# Patient Record
Sex: FEMALE | Race: WHITE | URBAN - METROPOLITAN AREA
[De-identification: names, ages, dates, MRNs, and addresses within clinical notes are randomized per-mention and may not be internally consistent; named-entity substitution may affect disease eponyms.]

---

## 2017-01-01 ENCOUNTER — HOSPITAL ENCOUNTER (OUTPATIENT)
Facility: CLINIC | Age: 42
Setting detail: OBSERVATION
Discharge: HOME | End: 2017-01-04
Attending: EMERGENCY MEDICINE | Admitting: INTERNAL MEDICINE
Payer: COMMERCIAL

## 2017-01-01 DIAGNOSIS — K80.20 CALCULUS OF GALLBLADDER WITHOUT CHOLECYSTITIS WITHOUT OBSTRUCTION: ICD-10-CM

## 2017-01-01 DIAGNOSIS — K81.9 CHOLECYSTITIS: Primary | ICD-10-CM

## 2017-01-01 LAB
ALBUMIN UR-MCNC: NEGATIVE MG/DL
APPEARANCE UR: CLEAR
BILIRUB UR QL STRIP: NEGATIVE
COLOR UR AUTO: YELLOW
GLUCOSE UR STRIP-MCNC: NEGATIVE MG/DL
HCG UR QL: NEGATIVE
HGB UR QL STRIP: NEGATIVE
KETONES UR STRIP-MCNC: NEGATIVE MG/DL
LEUKOCYTE ESTERASE UR QL STRIP: NEGATIVE
NITRATE UR QL: NEGATIVE
PH UR STRIP: 5.5 PH (ref 5–7)
RBC #/AREA URNS AUTO: 1 /HPF (ref 0–2)
SP GR UR STRIP: 1.02 (ref 1–1.03)
SQUAMOUS #/AREA URNS AUTO: 3 /HPF (ref 0–1)
URN SPEC COLLECT METH UR: ABNORMAL
UROBILINOGEN UR STRIP-MCNC: NORMAL MG/DL (ref 0–2)
WBC #/AREA URNS AUTO: 0 /HPF (ref 0–2)

## 2017-01-01 PROCEDURE — 96376 TX/PRO/DX INJ SAME DRUG ADON: CPT

## 2017-01-01 PROCEDURE — 81025 URINE PREGNANCY TEST: CPT | Performed by: EMERGENCY MEDICINE

## 2017-01-01 PROCEDURE — 99285 EMERGENCY DEPT VISIT HI MDM: CPT | Mod: 25

## 2017-01-01 PROCEDURE — 81001 URINALYSIS AUTO W/SCOPE: CPT | Performed by: EMERGENCY MEDICINE

## 2017-01-01 PROCEDURE — 87086 URINE CULTURE/COLONY COUNT: CPT | Performed by: EMERGENCY MEDICINE

## 2017-01-01 PROCEDURE — 96375 TX/PRO/DX INJ NEW DRUG ADDON: CPT | Mod: 59

## 2017-01-01 PROCEDURE — 96374 THER/PROPH/DIAG INJ IV PUSH: CPT | Mod: 59

## 2017-01-01 RX ORDER — ONDANSETRON 2 MG/ML
4 INJECTION INTRAMUSCULAR; INTRAVENOUS EVERY 30 MIN PRN
Status: DISCONTINUED | OUTPATIENT
Start: 2017-01-01 | End: 2017-01-02

## 2017-01-01 RX ORDER — LIDOCAINE 40 MG/G
CREAM TOPICAL
Status: DISCONTINUED | OUTPATIENT
Start: 2017-01-01 | End: 2017-01-02

## 2017-01-01 RX ORDER — SODIUM CHLORIDE 9 MG/ML
1000 INJECTION, SOLUTION INTRAVENOUS CONTINUOUS
Status: DISCONTINUED | OUTPATIENT
Start: 2017-01-01 | End: 2017-01-02

## 2017-01-01 RX ORDER — MORPHINE SULFATE 4 MG/ML
4 INJECTION, SOLUTION INTRAMUSCULAR; INTRAVENOUS
Status: COMPLETED | OUTPATIENT
Start: 2017-01-01 | End: 2017-01-02

## 2017-01-01 NOTE — IP AVS SNAPSHOT
MRN:[id number]                      After Visit Summary   1/1/2017    Arcelia Odom    MRN: [id number]           Thank you!     Thank you for choosing [facility] for your care. Our goal is always to provide you with excellent care. Hearing back from our patients is one way we can continue to improve our services. Please take a few minutes to complete the written survey that you may receive in the mail after you visit. If you would like to speak to someone directly about your visit please contact Patient Relations at [phone]. Thank you!          Patient Information     Date Of Birth          1975        About your hospital stay     You were admitted on:  January 2, 2017 You last received care in the:  [facility]    You were discharged on:  January 4, 2017        Reason for your hospital stay       You were admitted for concerns of a gallbladder attack. You were seen by general surgery who opted to remove your gallbladder. You did well following the procedure so you were allowed to discharge home.                  Who to Call     For medical emergencies, please call 911.  For non-urgent questions about your medical care, please call your primary care provider or clinic, [phone]  For questions related to your surgery, please call your surgery clinic        Attending Provider     Provider    Zenon Ugalde DO Loecken, Scott Peter, MD       Primary Care Provider Office Phone # Fax #    [facility] [phone] [id number] [facility] [address]        After Care Instructions     Activity       Your activity upon discharge: Activity as advised by surgery.            Diet       Follow this diet upon discharge: Diet as advised by surgery.                  Follow-up Appointments     Follow-up and recommended labs and tests        Follow up with surgery as recommended.  Follow up with PCP in 1-2 weeks for  hospital follow up. Recheck LFTs. Avoid alcohol use until recheck with PCP                  Further instructions from your care team       HOME CARE FOLLOWING LAPAROSCOPIC CHOLECYSTECTOMY  MAHOGANY Hernandez, MARIETTA Perez, RAMONITA Gillis, CLAU Clark    INCISIONAL CARE:  Replace the bandage over your incisions until all drainage stops, or if more comfortable to have in place.  If present, leave the steri-strips (white paper tapes) in place till they fall off.  If Dermabond (a type of skin glue) is present, leave in place until it wears/flakes off.     BATHING:  Avoid baths for 1 week after surgery.  Showers are okay.  You may wash your hair at any time.  Gently pat your incisions dry after bathing.    ACTIVITY:  Light Activity -- you may immediately be up and about as tolerated.  Driving -- you may drive when comfortable and off narcotic pain medications.  Light Work -- resume when comfortable off pain medications.  (If you can drive, you probably can work.)  Strenuous Work/Activity -- limit lifting to 20 pounds for 1 week.  Progressively increase with time.  Active Sports (running, biking, etc.) -- cautiously resume after 2 weeks.    DISCOMFORT:  Use pain medications as prescribed by your surgeon.  Take the pain medication with some food, when possible, to minimize side effects.  Intermittent use of ice packs at the incision sites may help during the first 48 hours.  Expect gradual improvement.  You may experience shoulder pain, which is due to the air placed within your abdomen during the procedure.  This is temporary and usually passes within 2 days.    DIET:  Drink plenty of fluids.  While taking pain medications, increase dietary fiber or add a fiber supplementation like Metamucil or Citrucel to help prevent constipation - a possible side effect of pain medications.  It is not uncommon to experience some bowel changes (loose stools or constipation) after surgery.  Your body has to adapt  to you no longer having a gall bladder.  To help minimize this side effect, avoid fatty foods for the first week after surgery.  You may then slowly increase the amount of fatty foods in your diet.      NAUSEA:  If nauseated from the anesthetic/pain meds; rest in bed, get up cautiously with assistance, and drink clear liquids (juice, tea, broth).    RETURN APPOINTMENT:  Schedule a follow-up visit 1-3 weeks post-op.  Office Phone:  [phone]     CONTACT US IF THE FOLLOWING DEVELOPS:   1. A fever that is above 101     2. If there is a large amount of drainage, bleeding, or swelling.   3. Severe pain that is not relieved by your prescription.   4. Drainage that is thick, cloudy, yellow, green or white.   5. Any other questions not answered by  Frequently Asked Questions  sheet.      FREQUENTLY ASKED QUESTIONS:    Q:  How should my incision look?    A:  Normally your incision will appear slightly swollen with light redness directly along the incision itself as it heals.  It may feel like a bump or ridge as the healing/scarring happens, and over time (3-4 months) this bump or ridge feeling should slowly go away.  In general, clear or pink watery drainage can be normal at first as your incision heals, but should decrease over time.    Q:  How do I know if my incision is infected?  A:  Look at your incision for signs of infection, like redness around the incision spreading to surrounding skin, or drainage of cloudy or foul-smelling drainage.  If you feel warm, check your temperature to see if you are running a fever.    **If any of these things occur, please notify the nurse at our office.  We may need you to come into the office for an incision check.      Q:  How do I take care of my incision?  A:  If you have a dressing in place - Starting the day after surgery, replace the dressing 1-2 times a day until there is no further drainage from the incision.  At that time, a dressing is no longer needed.  Try to minimize  tape on the skin if irritation is occurring at the tape sites.  If you have significant irritation from tape on the skin, please call the office to discuss other method of dressing your incision.    Small pieces of tape called  steri-strips  may be present directly overlying your incision; these may be removed 10 days after surgery unless otherwise specified by your surgeon.  If these tapes start to loosen at the ends, you may trim them back until they fall off or are removed.    A:  If you had  Dermabond  tissue glue used as a dressing (this causes your incision to look shiny with a clear covering over it) - This type of dressing wears off with time and does not require more dressings over the top unless it is draining around the glue as it wears off.  Do not apply ointments or lotions over the incisions until the glue has completely worn off.    Q:  There is a piece of tape or a sticky  lead  still on my skin.  Can I remove this?  A:  Sometimes the sticky  leads  used for monitoring during surgery or for evaluation in the emergency department are not all removed while you are in the hospital.  These sometimes have a tab or metal dot on them.  You can easily remove these on your own, like taking off a band-aid.  If there is a gel substance under the  lead , simply wipe/clean it off with a washcloth or paper towel.      Q:  What can I do to minimize constipation (very hard stools, or lack of stools)?  A:  Stay well hydrated.  Increase your dietary fiber intake or take a fiber supplement -with plenty of water.  Walk around frequently.  You may consider an over-the-counter stool-softener.  Your Pharmacist can assist you with choosing one that is stocked at your pharmacy.  Constipation is also one of the most common side effects of pain medication.  If you are using pain medication, be pro-active and try to PREVENT problems with constipation by taking the steps above BEFORE constipation becomes a problem.    Q:  What  do I do if I need more pain medications?  A:  Call the office to receive refills.  Be aware that certain pain meds cannot be called into a pharmacy and actually require a paper prescription.  A change may be made in your pain med as you progress thru your recovery period or if you have side effects to certain meds.    --Pain meds are NOT refilled after 5pm on weekdays, and NOT AT ALL on the weekends, so please look ahead to prevent problems.      Q:  Why am I having a hard time sleeping now that I am at home?  A:  Many medications you receive while you are in the hospital can impact your sleep for a number of days after your surgery/hospitalization.  Decreased level of activity and naps during the day may also make sleeping at night difficult.  Try to minimize day-time naps, and get up frequently during the day to walk around your home during your recovery time.  Sleep aides may be of some help, but are not recommended for long-term use.      Q:  I am having some back discomfort.  What should I do?  A:  This may be related to certain positioning that was required for your surgery, extended periods of time in bed, or other changes in your overall activity level.  You may try ice, heat, acetaminophen, or ibuprofen to treat this temporarily.  Note that many pain medications have acetaminophen in them and would state this on the prescription bottle.  Be sure not to exceed the maximum of 4000mg per day of acetaminophen.     **If the pain you are having does not resolve, is severe, or is a flare of back pain you have had on other occasions prior to surgery, please contact your primary physician for further recommendations or for an appointment to be examined at their office.    Q:  Why am I having headaches?  A:  Headaches can be caused by many things:  caffeine withdrawal, use of pain meds, dehydration, high blood pressure, lack of sleep, over-activity/exhaustion, flare-up of usual migraine headaches.  If you feel this  is related to muscle tension (a band-like feeling around the head, or a pressure at the low-back of the head) you may try ice or heat to this area.  You may need to drink more fluids (try electrolyte drink like Gatorade), rest, or take your usual migraine medications.   **If your headaches do not resolve, worsen, are accompanied by other symptoms, or if your blood pressure is high, please call your primary physician for recommendation and/or examination.    Q:  I am unable to urinate.  What do I do?  A:  A small percentage of people can have difficulty urinating initially after surgery.  This includes being able to urinate only a very small amount at a time and feeling discomfort or pressure in the very low abdomen.  This is called  urinary retention , and is actually an urgent situation.  Proceed to your nearest Emergency department for evaluation (not an Urgent Care Center).  Sometimes the bladder does not work correctly after certain medications you receive during surgery, or related to certain procedures.  You may need to have a catheter placed until your bladder recovers.  When planning to go to an Emergency department, it may help to call the ER to let them know you are coming in for this problem after a surgery.  This may help you get in quicker to be evaluated.  **If you have symptoms of a urinary tract infection, please contact your primary physician for the proper evaluation and treatment.          If you have other questions, please call the office Monday thru Friday between 8am and 5pm to discuss with the nurse or physician assistant.  [phone]    There is a surgeon ON CALL on weekday evenings and over the weekend in case of urgent need only, and may be contacted at the same number.    If you are having an emergency, call 911 or proceed to your nearest emergency department.          Pending Results     No orders found from 12/31/2016 to 1/2/2017.            Statement of Approval     Ordered        "   1756  I have reviewed and agree with all the recommendations and orders detailed in this document.   EFFECTIVE NOW     Approved and electronically signed by:  Cecelia Stack PA-C           17 0921  I have reviewed and agree with all the recommendations and orders detailed in this document.   EFFECTIVE NOW     Approved and electronically signed by:  Raquel Spencer PA-C           17 0856  I have reviewed and agree with all the recommendations and orders detailed in this document.   EFFECTIVE NOW     Approved and electronically signed by:  Pearl Dasilva PA-C             Admission Information        Department Dept Phone    2017  Pediatrics [phone]      Your Vitals Were     Blood Pressure Pulse Temperature Respirations    123/81 mmHg 68 97.5  F (36.4  C) (Oral) 16    Height Weight BMI (Body Mass Index) Pulse Oximetry    1.626 m (5' 4\") 69.6 kg (153 lb 7 oz) 26.32 kg/m2 94%    Last Period             2016 (Exact Date)         [employer] Information     [employer] lets you send messages to your doctor, view your test results, renew your prescriptions, schedule appointments and more. To sign up, go to [email or web address] . Click on \"Log in\" on the left side of the screen, which will take you to the Welcome page. Then click on \"Sign up Now\" on the right side of the page.     You will be asked to enter the access code listed below, as well as some personal information. Please follow the directions to create your username and password.     Your access code is: [email or web address]  Expires: 2017  3:52 AM     Your access code will  in 90 days. If you need help or a new code, please call your [facility] clinic or [phone].        Care EveryWhere ID     This is your Care EveryWhere ID. This could be used by other organizations to access your [facility] medical records  [id number]           Review of your medicines      START taking        Dose / Directions    " oxyCODONE 5 MG IR tablet   Commonly known as:  ROXICODONE   Used for:  Cholecystitis   Notes to Patient:  Drink plenty of fluids and take a stool softener to prevent constipation.        Dose:  5-10 mg   Take 1-2 tablets (5-10 mg) by mouth every 3 hours as needed for moderate to severe pain   Quantity:  30 tablet   Refills:  0            Where to get your medicines      Some of these will need a paper prescription and others can be bought over the counter. Ask your nurse if you have questions.     Bring a paper prescription for each of these medications    - oxyCODONE 5 MG IR tablet             Protect others around you: Learn how to safely use, store and throw away your medicines at www.disposemymeds.org.             Medication List: This is a list of all your medications and when to take them. Check marks below indicate your daily home schedule. Keep this list as a reference.      Medications           Morning Afternoon Evening Bedtime As Needed    oxyCODONE 5 MG IR tablet   Commonly known as:  ROXICODONE   Take 1-2 tablets (5-10 mg) by mouth every 3 hours as needed for moderate to severe pain   Notes to Patient:  Drink plenty of fluids and take a stool softener to prevent constipation.

## 2017-01-01 NOTE — IP AVS SNAPSHOT
[facility]    [address]    [facility] [address]    Phone:  [phone]    Fax:  [phone]                                       After Visit Summary   1/1/2017    Arcelia Odom    MRN: [id number]           After Visit Summary Signature Page     I have received my discharge instructions, and my questions have been answered. I have discussed any challenges I see with this plan with the nurse or doctor.    ..........................................................................................................................................  Patient/Patient Representative Signature      ..........................................................................................................................................  Patient Representative Print Name and Relationship to Patient    ..................................................               ................................................  Date                                            Time    ..........................................................................................................................................  Reviewed by Signature/Title    ...................................................              ..............................................  Date                                                            Time

## 2017-01-01 NOTE — LETTER
[facility]  [address], [address]  [place]  [id number] [phone]    2017    RE: Arcelia Odom  :  1975      This is to certify that Arcelia Odom has been under my care for surgery since 2016.      Patient is able to return to work/school without restrictions as of 2016 or earlier if able.        Sincerely,            Raquel Spencer PA-C

## 2017-01-02 ENCOUNTER — APPOINTMENT (OUTPATIENT)
Dept: ULTRASOUND IMAGING | Facility: CLINIC | Age: 42
End: 2017-01-02
Attending: EMERGENCY MEDICINE
Payer: COMMERCIAL

## 2017-01-02 ENCOUNTER — APPOINTMENT (OUTPATIENT)
Dept: GENERAL RADIOLOGY | Facility: CLINIC | Age: 42
End: 2017-01-02
Attending: SURGERY
Payer: COMMERCIAL

## 2017-01-02 ENCOUNTER — ANESTHESIA (OUTPATIENT)
Dept: SURGERY | Facility: CLINIC | Age: 42
End: 2017-01-02
Payer: COMMERCIAL

## 2017-01-02 ENCOUNTER — ANESTHESIA EVENT (OUTPATIENT)
Dept: SURGERY | Facility: CLINIC | Age: 42
End: 2017-01-02
Payer: COMMERCIAL

## 2017-01-02 ENCOUNTER — APPOINTMENT (OUTPATIENT)
Dept: CT IMAGING | Facility: CLINIC | Age: 42
End: 2017-01-02
Attending: EMERGENCY MEDICINE
Payer: COMMERCIAL

## 2017-01-02 PROBLEM — K81.9 CHOLECYSTITIS: Status: ACTIVE | Noted: 2017-01-02

## 2017-01-02 LAB
ALBUMIN SERPL-MCNC: 3.5 G/DL (ref 3.4–5)
ALP SERPL-CCNC: 127 U/L (ref 40–150)
ALT SERPL W P-5'-P-CCNC: 216 U/L (ref 0–50)
ANION GAP SERPL CALCULATED.3IONS-SCNC: 8 MMOL/L (ref 3–14)
AST SERPL W P-5'-P-CCNC: 446 U/L (ref 0–45)
BASOPHILS # BLD AUTO: 0 10E9/L (ref 0–0.2)
BASOPHILS NFR BLD AUTO: 0.4 %
BILIRUB SERPL-MCNC: 0.6 MG/DL (ref 0.2–1.3)
BUN SERPL-MCNC: 11 MG/DL (ref 7–30)
CALCIUM SERPL-MCNC: 9.8 MG/DL (ref 8.5–10.1)
CHLORIDE SERPL-SCNC: 102 MMOL/L (ref 94–109)
CO2 SERPL-SCNC: 27 MMOL/L (ref 20–32)
CREAT SERPL-MCNC: 0.73 MG/DL (ref 0.52–1.04)
DIFFERENTIAL METHOD BLD: NORMAL
EOSINOPHIL # BLD AUTO: 0.2 10E9/L (ref 0–0.7)
EOSINOPHIL NFR BLD AUTO: 2 %
ERYTHROCYTE [DISTWIDTH] IN BLOOD BY AUTOMATED COUNT: 12.2 % (ref 10–15)
GFR SERPL CREATININE-BSD FRML MDRD: 87 ML/MIN/1.7M2
GLUCOSE SERPL-MCNC: 105 MG/DL (ref 70–99)
HCT VFR BLD AUTO: 40.9 % (ref 35–47)
HGB BLD-MCNC: 13.9 G/DL (ref 11.7–15.7)
IMM GRANULOCYTES # BLD: 0 10E9/L (ref 0–0.4)
IMM GRANULOCYTES NFR BLD: 0.2 %
LIPASE SERPL-CCNC: 117 U/L (ref 73–393)
LYMPHOCYTES # BLD AUTO: 1.7 10E9/L (ref 0.8–5.3)
LYMPHOCYTES NFR BLD AUTO: 18.3 %
MCH RBC QN AUTO: 28.4 PG (ref 26.5–33)
MCHC RBC AUTO-ENTMCNC: 34 G/DL (ref 31.5–36.5)
MCV RBC AUTO: 84 FL (ref 78–100)
MONOCYTES # BLD AUTO: 0.8 10E9/L (ref 0–1.3)
MONOCYTES NFR BLD AUTO: 8.5 %
NEUTROPHILS # BLD AUTO: 6.7 10E9/L (ref 1.6–8.3)
NEUTROPHILS NFR BLD AUTO: 70.6 %
NRBC # BLD AUTO: 0 10*3/UL
NRBC BLD AUTO-RTO: 0 /100
PLATELET # BLD AUTO: 290 10E9/L (ref 150–450)
POTASSIUM SERPL-SCNC: 3.4 MMOL/L (ref 3.4–5.3)
PROT SERPL-MCNC: 7.7 G/DL (ref 6.8–8.8)
RBC # BLD AUTO: 4.89 10E12/L (ref 3.8–5.2)
SODIUM SERPL-SCNC: 137 MMOL/L (ref 133–144)
WBC # BLD AUTO: 9.5 10E9/L (ref 4–11)

## 2017-01-02 PROCEDURE — 25000125 ZZHC RX 250: Performed by: INTERNAL MEDICINE

## 2017-01-02 PROCEDURE — 47563 LAPARO CHOLECYSTECTOMY/GRAPH: CPT | Performed by: SURGERY

## 2017-01-02 PROCEDURE — 96365 THER/PROPH/DIAG IV INF INIT: CPT

## 2017-01-02 PROCEDURE — 99220 ZZC INITIAL OBSERVATION CARE,LEVL III: CPT | Performed by: INTERNAL MEDICINE

## 2017-01-02 PROCEDURE — 83690 ASSAY OF LIPASE: CPT | Performed by: EMERGENCY MEDICINE

## 2017-01-02 PROCEDURE — 25000125 ZZHC RX 250: Performed by: SURGERY

## 2017-01-02 PROCEDURE — 37000008 ZZH ANESTHESIA TECHNICAL FEE, 1ST 30 MIN: Performed by: SURGERY

## 2017-01-02 PROCEDURE — 96366 THER/PROPH/DIAG IV INF ADDON: CPT | Mod: 59

## 2017-01-02 PROCEDURE — 25000125 ZZHC RX 250: Performed by: EMERGENCY MEDICINE

## 2017-01-02 PROCEDURE — 25800025 ZZH RX 258: Performed by: ANESTHESIOLOGY

## 2017-01-02 PROCEDURE — 37000009 ZZH ANESTHESIA TECHNICAL FEE, EACH ADDTL 15 MIN: Performed by: SURGERY

## 2017-01-02 PROCEDURE — 85025 COMPLETE CBC W/AUTO DIFF WBC: CPT | Performed by: EMERGENCY MEDICINE

## 2017-01-02 PROCEDURE — 88304 TISSUE EXAM BY PATHOLOGIST: CPT | Performed by: SURGERY

## 2017-01-02 PROCEDURE — 76705 ECHO EXAM OF ABDOMEN: CPT

## 2017-01-02 PROCEDURE — 25000125 ZZHC RX 250: Performed by: ANESTHESIOLOGY

## 2017-01-02 PROCEDURE — 25000132 ZZH RX MED GY IP 250 OP 250 PS 637: Performed by: INTERNAL MEDICINE

## 2017-01-02 PROCEDURE — 25500064 ZZH RX 255 OP 636: Performed by: SURGERY

## 2017-01-02 PROCEDURE — 25500064 ZZH RX 255 OP 636: Performed by: EMERGENCY MEDICINE

## 2017-01-02 PROCEDURE — 25000566 ZZH SEVOFLURANE, EA 15 MIN: Performed by: SURGERY

## 2017-01-02 PROCEDURE — 36000058 ZZH SURGERY LEVEL 3 EA 15 ADDTL MIN: Performed by: SURGERY

## 2017-01-02 PROCEDURE — G0378 HOSPITAL OBSERVATION PER HR: HCPCS

## 2017-01-02 PROCEDURE — 88304 TISSUE EXAM BY PATHOLOGIST: CPT | Mod: 26 | Performed by: SURGERY

## 2017-01-02 PROCEDURE — 25000128 H RX IP 250 OP 636: Performed by: EMERGENCY MEDICINE

## 2017-01-02 PROCEDURE — 40000306 ZZH STATISTIC PRE PROC ASSESS II: Performed by: SURGERY

## 2017-01-02 PROCEDURE — 96375 TX/PRO/DX INJ NEW DRUG ADDON: CPT | Mod: 59

## 2017-01-02 PROCEDURE — 40000277 XR SURGERY CARM FLUORO LESS THAN 5 MIN W STILLS

## 2017-01-02 PROCEDURE — 25000128 H RX IP 250 OP 636: Performed by: INTERNAL MEDICINE

## 2017-01-02 PROCEDURE — 80053 COMPREHEN METABOLIC PANEL: CPT | Performed by: EMERGENCY MEDICINE

## 2017-01-02 PROCEDURE — 74177 CT ABD & PELVIS W/CONTRAST: CPT

## 2017-01-02 PROCEDURE — 36000060 ZZH SURGERY LEVEL 3 W FLUORO 1ST 30 MIN: Performed by: SURGERY

## 2017-01-02 PROCEDURE — 71000012 ZZH RECOVERY PHASE 1 LEVEL 1 FIRST HR: Performed by: SURGERY

## 2017-01-02 PROCEDURE — 99203 OFFICE O/P NEW LOW 30 MIN: CPT | Mod: 57 | Performed by: SURGERY

## 2017-01-02 PROCEDURE — 27210794 ZZH OR GENERAL SUPPLY STERILE: Performed by: SURGERY

## 2017-01-02 PROCEDURE — 25000125 ZZHC RX 250: Performed by: NURSE ANESTHETIST, CERTIFIED REGISTERED

## 2017-01-02 PROCEDURE — 27210995 ZZH RX 272: Performed by: SURGERY

## 2017-01-02 RX ORDER — BUPIVACAINE HYDROCHLORIDE 5 MG/ML
INJECTION, SOLUTION EPIDURAL; INTRACAUDAL PRN
Status: DISCONTINUED | OUTPATIENT
Start: 2017-01-02 | End: 2017-01-02

## 2017-01-02 RX ORDER — NALOXONE HYDROCHLORIDE 0.4 MG/ML
.1-.4 INJECTION, SOLUTION INTRAMUSCULAR; INTRAVENOUS; SUBCUTANEOUS
Status: DISCONTINUED | OUTPATIENT
Start: 2017-01-02 | End: 2017-01-03

## 2017-01-02 RX ORDER — AMPICILLIN AND SULBACTAM 2; 1 G/1; G/1
3 INJECTION, POWDER, FOR SOLUTION INTRAMUSCULAR; INTRAVENOUS EVERY 6 HOURS
Status: DISCONTINUED | OUTPATIENT
Start: 2017-01-02 | End: 2017-01-03

## 2017-01-02 RX ORDER — NALOXONE HYDROCHLORIDE 0.4 MG/ML
.1-.4 INJECTION, SOLUTION INTRAMUSCULAR; INTRAVENOUS; SUBCUTANEOUS
Status: DISCONTINUED | OUTPATIENT
Start: 2017-01-02 | End: 2017-01-02

## 2017-01-02 RX ORDER — MEPERIDINE HYDROCHLORIDE 25 MG/ML
12.5 INJECTION INTRAMUSCULAR; INTRAVENOUS; SUBCUTANEOUS
Status: DISCONTINUED | OUTPATIENT
Start: 2017-01-02 | End: 2017-01-02

## 2017-01-02 RX ORDER — GLYCOPYRROLATE 0.2 MG/ML
INJECTION, SOLUTION INTRAMUSCULAR; INTRAVENOUS PRN
Status: DISCONTINUED | OUTPATIENT
Start: 2017-01-02 | End: 2017-01-02

## 2017-01-02 RX ORDER — IOPAMIDOL 755 MG/ML
500 INJECTION, SOLUTION INTRAVASCULAR ONCE
Status: COMPLETED | OUTPATIENT
Start: 2017-01-02 | End: 2017-01-02

## 2017-01-02 RX ORDER — ALBUTEROL SULFATE 0.83 MG/ML
2.5 SOLUTION RESPIRATORY (INHALATION) EVERY 4 HOURS PRN
Status: DISCONTINUED | OUTPATIENT
Start: 2017-01-02 | End: 2017-01-02 | Stop reason: HOSPADM

## 2017-01-02 RX ORDER — PROMETHAZINE HYDROCHLORIDE 25 MG/ML
6.25 INJECTION, SOLUTION INTRAMUSCULAR; INTRAVENOUS
Status: DISCONTINUED | OUTPATIENT
Start: 2017-01-02 | End: 2017-01-02

## 2017-01-02 RX ORDER — ACETAMINOPHEN 325 MG/1
650 TABLET ORAL EVERY 4 HOURS PRN
Status: DISCONTINUED | OUTPATIENT
Start: 2017-01-02 | End: 2017-01-03

## 2017-01-02 RX ORDER — SODIUM CHLORIDE 9 MG/ML
INJECTION, SOLUTION INTRAVENOUS CONTINUOUS
Status: DISCONTINUED | OUTPATIENT
Start: 2017-01-02 | End: 2017-01-03

## 2017-01-02 RX ORDER — FENTANYL CITRATE 50 UG/ML
INJECTION, SOLUTION INTRAMUSCULAR; INTRAVENOUS PRN
Status: DISCONTINUED | OUTPATIENT
Start: 2017-01-02 | End: 2017-01-02

## 2017-01-02 RX ORDER — AMOXICILLIN 250 MG
1-2 CAPSULE ORAL 2 TIMES DAILY
Status: DISCONTINUED | OUTPATIENT
Start: 2017-01-02 | End: 2017-01-03

## 2017-01-02 RX ORDER — PROPOFOL 10 MG/ML
INJECTION, EMULSION INTRAVENOUS PRN
Status: DISCONTINUED | OUTPATIENT
Start: 2017-01-02 | End: 2017-01-02

## 2017-01-02 RX ORDER — ONDANSETRON 4 MG/1
4 TABLET, ORALLY DISINTEGRATING ORAL EVERY 30 MIN PRN
Status: DISCONTINUED | OUTPATIENT
Start: 2017-01-02 | End: 2017-01-02

## 2017-01-02 RX ORDER — FENTANYL CITRATE 50 UG/ML
25-50 INJECTION, SOLUTION INTRAMUSCULAR; INTRAVENOUS
Status: DISCONTINUED | OUTPATIENT
Start: 2017-01-02 | End: 2017-01-02 | Stop reason: HOSPADM

## 2017-01-02 RX ORDER — NEOSTIGMINE METHYLSULFATE 1 MG/ML
VIAL (ML) INJECTION PRN
Status: DISCONTINUED | OUTPATIENT
Start: 2017-01-02 | End: 2017-01-02

## 2017-01-02 RX ORDER — ONDANSETRON 4 MG/1
4 TABLET, ORALLY DISINTEGRATING ORAL EVERY 6 HOURS PRN
Status: DISCONTINUED | OUTPATIENT
Start: 2017-01-02 | End: 2017-01-03

## 2017-01-02 RX ORDER — POLYETHYLENE GLYCOL 3350 17 G/17G
17 POWDER, FOR SOLUTION ORAL DAILY PRN
Status: DISCONTINUED | OUTPATIENT
Start: 2017-01-02 | End: 2017-01-03

## 2017-01-02 RX ORDER — OXYCODONE HYDROCHLORIDE 5 MG/1
5-10 TABLET ORAL
Status: DISCONTINUED | OUTPATIENT
Start: 2017-01-02 | End: 2017-01-03

## 2017-01-02 RX ORDER — SODIUM CHLORIDE, SODIUM LACTATE, POTASSIUM CHLORIDE, CALCIUM CHLORIDE 600; 310; 30; 20 MG/100ML; MG/100ML; MG/100ML; MG/100ML
1000 INJECTION, SOLUTION INTRAVENOUS CONTINUOUS
Status: DISCONTINUED | OUTPATIENT
Start: 2017-01-02 | End: 2017-01-02

## 2017-01-02 RX ORDER — LIDOCAINE 40 MG/G
CREAM TOPICAL
Status: DISCONTINUED | OUTPATIENT
Start: 2017-01-02 | End: 2017-01-02 | Stop reason: HOSPADM

## 2017-01-02 RX ORDER — LIDOCAINE HYDROCHLORIDE 10 MG/ML
INJECTION, SOLUTION INFILTRATION; PERINEURAL PRN
Status: DISCONTINUED | OUTPATIENT
Start: 2017-01-02 | End: 2017-01-02

## 2017-01-02 RX ORDER — ONDANSETRON 2 MG/ML
4 INJECTION INTRAMUSCULAR; INTRAVENOUS EVERY 6 HOURS PRN
Status: DISCONTINUED | OUTPATIENT
Start: 2017-01-02 | End: 2017-01-03

## 2017-01-02 RX ORDER — MORPHINE SULFATE 4 MG/ML
4 INJECTION, SOLUTION INTRAMUSCULAR; INTRAVENOUS ONCE
Status: COMPLETED | OUTPATIENT
Start: 2017-01-02 | End: 2017-01-02

## 2017-01-02 RX ORDER — CEFAZOLIN SODIUM 2 G/100ML
2 INJECTION, SOLUTION INTRAVENOUS
Status: COMPLETED | OUTPATIENT
Start: 2017-01-02 | End: 2017-01-02

## 2017-01-02 RX ORDER — SODIUM CHLORIDE, SODIUM LACTATE, POTASSIUM CHLORIDE, CALCIUM CHLORIDE 600; 310; 30; 20 MG/100ML; MG/100ML; MG/100ML; MG/100ML
INJECTION, SOLUTION INTRAVENOUS CONTINUOUS
Status: DISCONTINUED | OUTPATIENT
Start: 2017-01-02 | End: 2017-01-02 | Stop reason: HOSPADM

## 2017-01-02 RX ORDER — ONDANSETRON 2 MG/ML
INJECTION INTRAMUSCULAR; INTRAVENOUS PRN
Status: DISCONTINUED | OUTPATIENT
Start: 2017-01-02 | End: 2017-01-02

## 2017-01-02 RX ORDER — HYDROMORPHONE HYDROCHLORIDE 1 MG/ML
.3-.5 INJECTION, SOLUTION INTRAMUSCULAR; INTRAVENOUS; SUBCUTANEOUS
Status: DISCONTINUED | OUTPATIENT
Start: 2017-01-02 | End: 2017-01-03

## 2017-01-02 RX ORDER — ONDANSETRON 2 MG/ML
4 INJECTION INTRAMUSCULAR; INTRAVENOUS EVERY 30 MIN PRN
Status: DISCONTINUED | OUTPATIENT
Start: 2017-01-02 | End: 2017-01-02

## 2017-01-02 RX ORDER — HYDROMORPHONE HYDROCHLORIDE 1 MG/ML
.3-.5 INJECTION, SOLUTION INTRAMUSCULAR; INTRAVENOUS; SUBCUTANEOUS EVERY 10 MIN PRN
Status: DISCONTINUED | OUTPATIENT
Start: 2017-01-02 | End: 2017-01-02

## 2017-01-02 RX ADMIN — GLYCOPYRROLATE 0.4 MG: 0.2 INJECTION, SOLUTION INTRAMUSCULAR; INTRAVENOUS at 13:29

## 2017-01-02 RX ADMIN — ONDANSETRON 4 MG: 2 INJECTION INTRAMUSCULAR; INTRAVENOUS at 00:23

## 2017-01-02 RX ADMIN — HYDROMORPHONE HYDROCHLORIDE 0.5 MG: 10 INJECTION, SOLUTION INTRAMUSCULAR; INTRAVENOUS; SUBCUTANEOUS at 15:36

## 2017-01-02 RX ADMIN — Medication 3 MG: at 13:29

## 2017-01-02 RX ADMIN — FENTANYL CITRATE 150 MCG: 50 INJECTION, SOLUTION INTRAMUSCULAR; INTRAVENOUS at 12:44

## 2017-01-02 RX ADMIN — HYDROMORPHONE HYDROCHLORIDE 0.5 MG: 10 INJECTION, SOLUTION INTRAMUSCULAR; INTRAVENOUS; SUBCUTANEOUS at 21:24

## 2017-01-02 RX ADMIN — FENTANYL CITRATE 50 MCG: 50 INJECTION INTRAMUSCULAR; INTRAVENOUS at 14:03

## 2017-01-02 RX ADMIN — AMPICILLIN SODIUM AND SULBACTAM SODIUM 3 G: 2; 1 INJECTION, POWDER, FOR SOLUTION INTRAMUSCULAR; INTRAVENOUS at 10:59

## 2017-01-02 RX ADMIN — SODIUM CHLORIDE: 900 INJECTION INTRAVENOUS at 05:05

## 2017-01-02 RX ADMIN — SENNOSIDES AND DOCUSATE SODIUM 2 TABLET: 8.6; 5 TABLET ORAL at 19:53

## 2017-01-02 RX ADMIN — PROPOFOL 150 MG: 10 INJECTION, EMULSION INTRAVENOUS at 12:44

## 2017-01-02 RX ADMIN — AMPICILLIN SODIUM AND SULBACTAM SODIUM 3 G: 2; 1 INJECTION, POWDER, FOR SOLUTION INTRAMUSCULAR; INTRAVENOUS at 22:30

## 2017-01-02 RX ADMIN — HYDROMORPHONE HYDROCHLORIDE 0.5 MG: 10 INJECTION, SOLUTION INTRAMUSCULAR; INTRAVENOUS; SUBCUTANEOUS at 18:21

## 2017-01-02 RX ADMIN — ROCURONIUM BROMIDE 30 MG: 10 INJECTION INTRAVENOUS at 12:44

## 2017-01-02 RX ADMIN — HYDROMORPHONE HYDROCHLORIDE 1 MG: 1 INJECTION, SOLUTION INTRAMUSCULAR; INTRAVENOUS; SUBCUTANEOUS at 12:57

## 2017-01-02 RX ADMIN — SODIUM CHLORIDE, POTASSIUM CHLORIDE, SODIUM LACTATE AND CALCIUM CHLORIDE: 600; 310; 30; 20 INJECTION, SOLUTION INTRAVENOUS at 12:27

## 2017-01-02 RX ADMIN — ROCURONIUM BROMIDE 20 MG: 10 INJECTION INTRAVENOUS at 12:51

## 2017-01-02 RX ADMIN — SODIUM CHLORIDE 1000 ML: 9 INJECTION, SOLUTION INTRAVENOUS at 00:23

## 2017-01-02 RX ADMIN — LIDOCAINE HYDROCHLORIDE 30 MG: 10 INJECTION, SOLUTION INFILTRATION; PERINEURAL at 12:44

## 2017-01-02 RX ADMIN — SODIUM CHLORIDE: 900 INJECTION INTRAVENOUS at 15:09

## 2017-01-02 RX ADMIN — SODIUM CHLORIDE 69 ML: 9 INJECTION, SOLUTION INTRAVENOUS at 01:07

## 2017-01-02 RX ADMIN — SODIUM CHLORIDE, POTASSIUM CHLORIDE, SODIUM LACTATE AND CALCIUM CHLORIDE: 600; 310; 30; 20 INJECTION, SOLUTION INTRAVENOUS at 13:19

## 2017-01-02 RX ADMIN — MORPHINE SULFATE 4 MG: 4 INJECTION, SOLUTION INTRAMUSCULAR; INTRAVENOUS at 02:11

## 2017-01-02 RX ADMIN — AMPICILLIN SODIUM AND SULBACTAM SODIUM 3 G: 2; 1 INJECTION, POWDER, FOR SOLUTION INTRAMUSCULAR; INTRAVENOUS at 17:01

## 2017-01-02 RX ADMIN — GLYCOPYRROLATE 0.2 MG: 0.2 INJECTION, SOLUTION INTRAMUSCULAR; INTRAVENOUS at 12:44

## 2017-01-02 RX ADMIN — AMPICILLIN SODIUM AND SULBACTAM SODIUM 3 G: 2; 1 INJECTION, POWDER, FOR SOLUTION INTRAMUSCULAR; INTRAVENOUS at 05:05

## 2017-01-02 RX ADMIN — IOPAMIDOL 85 ML: 755 INJECTION, SOLUTION INTRAVENOUS at 01:06

## 2017-01-02 RX ADMIN — MORPHINE SULFATE 4 MG: 4 INJECTION, SOLUTION INTRAMUSCULAR; INTRAVENOUS at 00:23

## 2017-01-02 RX ADMIN — MIDAZOLAM HYDROCHLORIDE 2 MG: 1 INJECTION, SOLUTION INTRAMUSCULAR; INTRAVENOUS at 12:40

## 2017-01-02 RX ADMIN — ONDANSETRON 4 MG: 2 INJECTION INTRAMUSCULAR; INTRAVENOUS at 13:29

## 2017-01-02 RX ADMIN — CEFAZOLIN SODIUM 2 G: 2 INJECTION, SOLUTION INTRAVENOUS at 12:40

## 2017-01-02 RX ADMIN — PHENYLEPHRINE HYDROCHLORIDE 100 MCG: 10 INJECTION, SOLUTION INTRAMUSCULAR; INTRAVENOUS; SUBCUTANEOUS at 13:05

## 2017-01-02 ASSESSMENT — ENCOUNTER SYMPTOMS
DIARRHEA: 0
APPETITE CHANGE: 0
ABDOMINAL PAIN: 1
MYALGIAS: 0
FREQUENCY: 0
FEVER: 0
HEMATURIA: 0
CONSTIPATION: 0
BLOOD IN STOOL: 0
DYSURIA: 0
CHILLS: 0
VOMITING: 1
NAUSEA: 1
DIFFICULTY URINATING: 0

## 2017-01-02 ASSESSMENT — PAIN DESCRIPTION - DESCRIPTORS
DESCRIPTORS: ACHING

## 2017-01-02 ASSESSMENT — ACTIVITIES OF DAILY LIVING (ADL)
DRESS: 0-->INDEPENDENT
COGNITION: 0 - NO COGNITION ISSUES REPORTED
RETIRED_COMMUNICATION: 0-->UNDERSTANDS/COMMUNICATES WITHOUT DIFFICULTY
TOILETING: 0-->INDEPENDENT
FALL_HISTORY_WITHIN_LAST_SIX_MONTHS: NO
SWALLOWING: 0-->SWALLOWS FOODS/LIQUIDS WITHOUT DIFFICULTY
BATHING: 0-->INDEPENDENT
TRANSFERRING: 0-->INDEPENDENT
AMBULATION: 0-->INDEPENDENT
RETIRED_EATING: 0-->INDEPENDENT

## 2017-01-02 ASSESSMENT — LIFESTYLE VARIABLES: TOBACCO_USE: 1

## 2017-01-02 NOTE — OR NURSING
RN on Peds notified patient was unable to void. Will attempt again later and patient transferred upstairs.

## 2017-01-02 NOTE — PHARMACY-ADMISSION MEDICATION HISTORY
Admission medication history interview status for this patient is complete. See EPIC admission navigator for allergy information, prior to admission medications and immunization status.     Medication history interview source(s):Patient  Medication history resources (including written lists, pill bottles, clinic record):None  Primary pharmacy:[facility]    Changes made to PTA medication list:  Added: ---  Deleted: ---  Changed: ---    Actions taken by pharmacist (provider contacted, etc):None     Additional medication history information:None    Medication reconciliation/reorder completed by provider prior to medication history? No      Prior to Admission medications    Not on File

## 2017-01-02 NOTE — CONSULTS
REASON FOR CONSULTATION:  Acute cholecystitis.      HISTORY OF PRESENT ILLNESS:  Ms. Arcelia Odom is a healthy 41-year-old female with no antecedent history of biliary tract disease who came to the ER overnight with abdominal pain.  She states that 2 nights ago she had acute onset of upper abdominal pain which she thought were gas cramps.  This has subsided somewhat, but after having lunch the next day, had recurrence of her symptoms.  She had some mild nausea and loss of appetite but no jaundice, icterus, or fevers.  She did have some loose stools over the last couple of days as well.  Workup in the ER found her to have a mild abnormality in her LFTs, subsequent imaging showed a distended gallbladder with multiple stones, mild wall thickening and minimal duct dilation.  This morning the patient feels somewhat better, though she has no appetite and states that her pain is still persisting, though to a lesser degree.      PAST MEDICAL AND SURGICAL HISTORY:  The patient had no previous surgeries, hospitalizations or chronic medical concerns.      CURRENT MEDICATIONS:  None.      ALLERGIES:  None.      SOCIAL HISTORY:  The patient is .  She is currently here by herself, she works at a [employer] company.  Smokes about 1/4 pack a day.      PHYSICAL EXAMINATION:   VITAL SIGNS:  Ms. Odom is afebrile, temperature maximum was 99.2, pulse 60 with a blood pressure 128/66 Respiratory rate is 14 and 96% saturations on room air.   GENERAL:  She is alert, appropriate, nontoxic.   HEART:  Sounds are regular.   RESPIRATORY:  Breath sounds are without wheezes or crackles.  Sclerae are anicteric, skin is not jaundiced.   ABDOMEN:  Soft without distention or scars.  She has mild epigastric tenderness on palpation and right upper quadrant tenderness with deep inspiration, but no overt Moura sign.      LABORATORY EXAMS:  Notable for ALT and AST of 246 and 446 respectively.  Bilirubin is normal 0.06.  Lipase was also normal.  White  blood cell count is 9.5000.  Hemoglobin is 13.9.      IMAGING:  I reviewed the patient's CT done last evening, this showed gallstones as noted on the subsequent ultrasound which showed a distended gallbladder and mild wall thickening up to 3 mm and mild hepatic duct dilation 7 mm.      IMPRESSION AND RECOMMENDATIONS:  A healthy 41-year-old female without antecedent history of biliary tract disease who presents with intermittent biliary colic and now what seems to be findings more consistent with cholecystitis given the persistence of her symptoms.  I met with the patient this morning, diagramed the biliary tree, discussed the pathophysiology of biliary colic, acute cholecystitis and the like.  I offered cholecystectomy today with cholangiograms given the abnormality of LFTs and duct dilation.  Risks, benefits and alternatives were all reviewed.  The patient is interested in pursuing surgery and will aid her in scheduling today.  We did discuss the potential to discharge this evening, provided she has an uneventful recovery as well.      Thank you very much for this consultation.         FILOMENA VENEGAS MD             D: 2017 07:50   T: 2017 08:06   MT: BLESSING#[id number]      Name:     ESTELLE CHRISTIE   MRN:      [id number]        Account:       [id number]   :      1975           Consult Date:  2017      Document: [id number]       cc: [facility]

## 2017-01-02 NOTE — ANESTHESIA PREPROCEDURE EVALUATION
Anesthesia Evaluation     .        ROS/MED HX    ENT/Pulmonary:     (+)tobacco use, , . .    Neurologic:  - neg neurologic ROS     Cardiovascular:  - neg cardiovascular ROS       METS/Exercise Tolerance:     Hematologic:  - neg hematologic  ROS       Musculoskeletal:  - neg musculoskeletal ROS       GI/Hepatic:  - neg GI/hepatic ROS       Renal/Genitourinary:  - ROS Renal section negative       Endo:  - neg endo ROS       Psychiatric:  - neg psychiatric ROS       Infectious Disease:  - neg infectious disease ROS       Malignancy:         Other: Comment: .Lab Test        01/02/17                       0020          WBC          9.5           HGB          13.9          MCV          84            PLT          290            Lab Test        01/02/17                       0020          NA           137           POTASSIUM    3.4           CHLORIDE     102           CO2          27            BUN          11            CR           0.73          ANIONGAP     8             JEOVANY          9.8           GLC          105*                        Physical Exam  Normal systems: cardiovascular and pulmonary    Airway   Mallampati: II    Dental     Cardiovascular   Rhythm and rate: regular and normal      Pulmonary    breath sounds clear to auscultation                    Anesthesia Plan      History & Physical Review  History and physical reviewed and following examination; no interval change.    ASA Status:  2 .        Plan for General and ETT with Intravenous induction. Maintenance will be Inhalation and Balanced.    PONV prophylaxis:  Ondansetron (or other 5HT-3) and Dexamethasone or Solumedrol       Postoperative Care      Consents  Anesthetic plan, risks, benefits and alternatives discussed with:  Patient or representative..                          .

## 2017-01-02 NOTE — ANESTHESIA CARE TRANSFER NOTE
Patient: Arcelia Odom    LAPAROSCOPIC CHOLECYSTECTOMY (N/A Abdomen)  Additional InformationProcedure(s):  LAPAROSCOPIC CHOLECYSTECTOMY  - Wound Class: II-Clean Contaminated    Diagnosis: cholecystitis  Diagnosis Additional Information: No value filed.    Anesthesia Type:   General, ETT     Note:  Airway :Face Mask  Patient transferred to:PACU  Comments: vss      Vitals: (Last set prior to Anesthesia Care Transfer)              Electronically Signed By: VARUN Rolon CRNA  January 2, 2017  1:46 PM

## 2017-01-02 NOTE — H&P
CHIEF COMPLAINT:  Abdominal pain.      HISTORY OF PRESENT ILLNESS:  Ms. Arcelia Odom is a 41-year-old female with no significant medical history.  She presents to the hospital today for concerns about abdominal pain.  She reports her abdominal pain started two evenings prior to this presentation.  Her pain initially lasted for two hours and then resolved.  Her pain resumed yesterday and has been persistent since that time.  She has also had emesis.  She has had no fevers or chills.  She has had no hematemesis.  She has noticed some stool changes over the past week with some loose stools alternating with constipation symptoms.  She has no history of similar pain in the past.      She is a healthy woman on no chronic medications.      On arrival in the ER this evening, vital signs included a blood pressure of 140/105 with a temperature of 99.2 degrees, heart rate of 80, saturation 97% on room air.  Workup in the ER included basic labs.  Urinalysis unremarkable.  Pregnancy test was negative.  CBC normal.  Lipase normal.  ALT is 269, AST is 446, with normal total bilirubin and alkaline phosphatase.      CT of the abdomen and pelvis was done, showing no acute abnormality.  Cholelithiasis present.      This was followed up with an abdominal ultrasound showing cholelithiasis and gallbladder wall borderline thickened.      I spoke with Dr. Ugalde of the ER who had talked with Dr. Gillis of General Surgery.  Plan is for admission to the hospital, with probable laparoscopic cholecystectomy later on today.      PAST MEDICAL HISTORY:  Nothing significant.      CURRENT MEDICATIONS:  None.      FAMILY HISTORY:  No gallstones in the family.      ALLERGIES:  None.      SOCIAL HISTORY:  The patient is a smoker, but states she is attempting to cut down on this.  She is a social drinker.      REVIEW OF SYSTEMS:  Please see HPI for details.  Comprehensive greater than 10-point review of systems is otherwise negative besides that  detailed above.      PHYSICAL EXAM:   VITAL SIGNS:  Blood pressure is currently 135/70, heart rate 60, afebrile, saturation 96% on room air.   GENERAL:  The patient appears nontoxic and in no acute distress.  Appears awake, alert and oriented x3.  Mood and affect normal and appropriate.   HEENT:  Head is atraumatic.  Sclerae white.  Eyelids normal.  Conjunctivae normal.  Extraocular movements intact.   NECK:  Supple.  No cervical or supraclavicular lymphadenopathy.   HEART:  Regular rate and rhythm.  No significant murmurs.  No lower extremity edema.   LUNGS:  Clear to auscultation bilaterally.  No intercostal retractions.  No conversational dyspnea.   ABDOMEN:  Notable for tenderness to palpation of the right upper quadrant.  Otherwise, nontender, nondistended.  Soft.  No masses.  No organomegaly.   EXTREMITIES:  No edema.   SKIN:  No rash, no jaundice.  Skin is dry to touch.   NEUROLOGIC:  Cranial nerves II through XII are intact.  Moves all extremities appropriately.  Sensation intact to light touch in the upper and lower extremities bilaterally.   PSYCHIATRIC:  The patient is awake, alert and oriented x3.  Mood and affect are normal and appropriate.      LABORATORY AND IMAGING DATA:  Reviewed above in HPI.      IMPRESSION:  Ms. Odom is a 41-year-old female, previously healthy, who presents to the hospital today for abdominal pain.  Workup consistent with cholecystitis.      DIAGNOSIS:  Acute cholecystitis.      PLAN:   1.  Admit to observation.   2.  General Surgery consultation.   3.  Unasyn IV.   4.  Pain control including Dilaudid IV p.r.n.   5.  Keep n.p.o.   6.  Anticipate laparoscopic cholecystectomy later on today.         KIMBERLY LOPEZ MD             D: 2017 04:37   T: 2017 06:19   MT: [id number]      Name:     ESTELLE ODOM   MRN:      [id number]        Account:      [id number]   :      1975           Admitted:     [id number]      Document: [id number]       cc: Lazaro Gillis MD        [facility]

## 2017-01-02 NOTE — PROGRESS NOTES
Patient was seen this AM prior to surgery and was doing better. The hope was she would discharge from PACU but unfortunately there is most likely a stone in her biliary tract so she will have to stay for GI consult and possible ERCP.

## 2017-01-02 NOTE — ED PROVIDER NOTES
"  History     Chief Complaint:  Abdominal pain     The history is provided by the patient and the spouse.      Arcelia Odom is a 41 year old female who presents with her  for evaluation of generalized, \"sharp, shooting\" abdominal pain that radiates to the low back that started last night around 12:30 am and lasted two hours. It then came on again acutely today at 3:30 PM and has been constant since onset. She also had an episode of nausea and vomiting at that time. She has been eating and drinking normally and regularly. She has been producing normal, non-bloody stool and urine regularly. No ill contacts and no fevers, chills, or myalgias. No previous abdominal surgeries. No ingestion of new foods.     Allergies:  No known drug allergies     Medications:    None    Past Medical History:    History reviewed. No pertinent past medical history.    Past Surgical History:    History reviewed. No pertinent past surgical history.    Family History:    History reviewed. No pertinent family history.     Social History:  Tobacco Use: Current Everyday smoker   Alcohol Use: Yes   Marital status:    Here in the ED with:      Review of Systems   Constitutional: Negative for fever, chills and appetite change.   Gastrointestinal: Positive for nausea, vomiting and abdominal pain. Negative for diarrhea, constipation and blood in stool.   Genitourinary: Negative for dysuria, urgency, frequency, hematuria, decreased urine volume and difficulty urinating.   Musculoskeletal: Negative for myalgias.   All other systems reviewed and are negative.      Physical Exam   First Vitals:  BP: (!) 141/103 mmHg  Pulse: 78  Temp: 99.2  F (37.3  C)  Resp: 18  SpO2: 97 %      Patient Vitals for the past 24 hrs:   BP Temp Temp src Pulse Heart Rate Resp SpO2 Weight   01/02/17 0438 128/66 mmHg 98  F (36.7  C) Oral - 60 14 - 69.6 kg (153 lb 7 oz)   01/02/17 0424 - - - - - - 96 % -   01/02/17 0415 - - - - - - 95 % -   01/02/17 " 0412 136/69 mmHg - - - - - 96 % -   01/02/17 0330 126/74 mmHg - - - - - 96 % -   01/02/17 0315 - - - - - - 97 % -   01/02/17 0300 122/80 mmHg - - - - - 96 % -   01/02/17 0245 - - - - - - 94 % -   01/02/17 0230 (!) 118/97 mmHg - - - - - - -   01/02/17 0058 - - - - - - 96 % -   01/02/17 0045 153/88 mmHg - - - - - 95 % -   01/02/17 0042 - - - - - - 96 % -   01/02/17 0030 144/89 mmHg - - - - - 96 % -   01/02/17 0026 135/81 mmHg - - - - - 98 % -   01/01/17 2320 (!) 141/103 mmHg 99.2  F (37.3  C) Temporal 78 - 18 97 % -       Physical Exam  Constitutional: Patient appears well-developed and well-nourished. Patient is in mild/moderate distress  HENT:    Head: No external signs of trauma noted.   Eyes: Conjunctivae are normal. Pupils are equal, round, and reactive to light. No scleral icterus  Cardiovascular: Normal rate, regular rhythm and normal heart sounds.  Exam reveals no friction rub.  No murmur heard.  Pulmonary/Chest: Effort normal and breath sounds normal. No respiratory distress. There are no wheezes. There are no rales.   Abdominal: Soft. Bowel sounds normal. There is no distension. There is LLQ, suprapubic, and RLQ tenderness. There is only minimal discomfort on RUQ palpation. The patient does have rebound tenderness in the RLQ and RUQ. There is no guarding.   Musculoskeletal: Normal range of motion. No CVA tenderness. There is some reproducible right lumbar paraspinal pain on percussion.  Neurological: Patient is alert and oriented to person, place, and time.   Skin: Skin is warm and dry. Patient is not diaphoretic. No jaundice.      Emergency Department Course   Imaging:  Radiographic findings were communicated with the patient and Admitting MD who voiced understanding of the findings.  CT Abdomen Pelvis w/ contrast:   1. No acute abnormality. No bowel obstruction or inflammation.  2. Cholelithiasis  Preliminary reading per Radiology. Final report to follow.    US Abdomen Limited (RUQ only):  1.  Cholelithiasis. The gallbladder wall is borderline thickened. No  other evidence of cholecystitis.  2. Mild dilatation of the common bile duct without cause of  obstruction seen. No intrahepatic biliary dilatation.  Preliminary reading per Radiology. Final report to follow.    Laboratory:  CBC: WBC: 9.5 (WNL), HGB: 13.9 (WNL), PLT: 290 (WNL), O/W WNL.    CMP: Glucose: 105 (high), ALT: 216 (high), AST: 446 (high), Creatinine: 0.73 (WNL), O/W WNL.    Lipase: 117 (WNL)    UA: Squamous Epithelial/HPF: 3 (high), O/W NEG.    HCG Qualitative: Negative    Interventions:   0023 Sodium chloride 0.9% 1,000 mL IV  0023 Zofran, 4 mg, IV  0023 Morphine, 4 mg, IV  0211 Morphine, 4 mg, IV    Emergency Department Course:   Nursing notes and vitals reviewed.  I performed an exam of the patient as documented above.    IV access was established.  Blood drawn and urine collected for laboratory testing  The patient underwent the following imaging: US,CT. See results above.   0147 Recheck. I discussed the results of the patient's testing and plan with the patient.  0316 I discussed the case with Dr. Gillis of surgery  0325 Recheck.  0349 I discussed the case with Dr. Jesus of the hospitalist service.  Findings and plan explained to the Patient who consents to admission. Discussed the patient with Dr. Jesus, who will admit the patient to a surg bed for further monitoring, evaluation, and treatment.       Impression & Plan    Medical Decision Making:  Arcelia Odom is a 41 year old female who presents to the ED complaining of diffuse abdominal pain. Her ED work-up demonstrated cholelithiasis. Her pain has improved with medications in the ED. I discussed the case with general surgery, and they state if the patient feels incredibly well and would like to follow-up in the outpatient setting, she could. But since her pain was fairly intense when she came in plus the US findings and blood work findings, it would be reasonable to  consider removing her gallbladder today. I discussed all this with the patient and her , and she would like to proceed with gallbladder removal today. I discussed this with the hospitalist and he will place her in the hospital for further care and coordination of this condition.     Diagnosis:    ICD-10-CM    1. Calculus of gallbladder without cholecystitis without obstruction K80.20        Disposition:  Admitted to the hospital for gallbladder removal    I, Jassi Avalos, am serving as a Scribe on 1/1/2017 at 11:53 PM to personally document the services performed by Zenon Ugalde DO, based upon my observations and the provider's statements to me.    1/1/2017   [facility] EMERGENCY DEPARTMENT        Zenon Ugalde DO  01/02/17 0638

## 2017-01-02 NOTE — PLAN OF CARE
Problem: Goal Outcome Summary  Goal: Goal Outcome Summary  Outcome: No Change  VSS, afebrile. Bowel sounds active. Passing flatus. Voiding without difficulty. IV patent and infusing. Ambulating independently. Denies pain. NPO. Will continue to monitor and provide for needs.

## 2017-01-02 NOTE — DISCHARGE SUMMARY
Patient was seen this AM by surgery who are are opting to perform cholecystectomy. As of now there is nothing that will prevent her from discharging from the PACU following the procedure. I spoke with her and she will have somebody that can stay with her for 24 hours after the procedure. Discharge orders have been placed and activity, diet and followup recs will be done by surgery.

## 2017-01-02 NOTE — ANESTHESIA POSTPROCEDURE EVALUATION
Patient: Arcelia Odom    LAPAROSCOPIC CHOLECYSTECTOMY (N/A Abdomen)  Additional InformationProcedure(s):  LAPAROSCOPIC CHOLECYSTECTOMY  - Wound Class: II-Clean Contaminated    Diagnosis:cholecystitis  Diagnosis Additional Information: Acute cholecystitis with retained common bile duct stone.      Anesthesia Type:  General, ETT    Note:  Anesthesia Post Evaluation    Patient location during evaluation: PACU  Patient participation: Able to fully participate in evaluation  Level of consciousness: awake  Pain management: adequate  Airway patency: patent  Cardiovascular status: acceptable  Respiratory status: acceptable  Hydration status: acceptable  PONV: controlled     Anesthetic complications: None          Last vitals:  Filed Vitals:    01/02/17 1430 01/02/17 1500 01/02/17 1515   BP: 124/71 133/82 132/78   Pulse:      Temp: 97.3  F (36.3  C) 97.7  F (36.5  C)    Resp: 18 16 16   SpO2: 92% 96% 94%       Electronically Signed By: Patrick Tineo DO  January 2, 2017  3:35 PM

## 2017-01-02 NOTE — OP NOTE
DATE OF PROCEDURE:  01/02/2017      PREOPERATIVE DIAGNOSIS:  Acute cholecystitis, common duct dilation.      POSTOPERATIVE DIAGNOSES:  Acute cholecystitis with retained common bile duct stone.      PROCEDURE:  Laparoscopic cholecystectomy with intraoperative cholangiogram.      ANESTHESIA:  General plus local.      SURGEON:  Keaton Sol MD      ASSISTANT:  Dipika Izquierdo PA-C, necessary for adequate exposure and minimization of operative time.      SPECIMENS:  Gallbladder and contents.      COMPLICATIONS:  None.      INDICATIONS:  Ms. Arcelia Odom is a healthy 41-year-old female who came to the ER overnight with abdominal pain.  She had several bouts of epigastric pain over the weekend without antecedent history of biliary tract disease.  Because of the symptoms, patient sought evaluation in the ER where she was found to have a distended gallbladder with mild wall thickening, mild duct dilation and several stones.  As the patient was having persistent symptoms this morning, I offered and recommended cholecystectomy today.  Risks of surgery including infection, bleeding, harm to adjacent structures, open conversion, retained stone, bile leak and common duct injury were all reviewed.  The patient verbalized understanding of the above and consented to proceed.      FINDINGS:  Gallbladder was mildly distended, there were stones within the lumen as well as some mild edema of the wall.  Intraoperative cholangiogram showed distal retained stone with some filling of contrast in the duodenum.  Critical view of safety was obtained prior to clipping and dividing the cystic duct, the identity was also confirmed by cholangiography is noted.      DESCRIPTION OF PROCEDURE:  With the patient under excellent general anesthesia in supine position, abdomen was prepped and draped in the usual sterile surgical fashion.  Timeout was then performed confirming the patient, procedure to be done as well as drug allergies.  She did  receive a dose of Ancef prior to incision for infection prophylaxis.  We began by making a curvilinear incision at the superior aspect of the umbilical skin and dissected down to the level of the epigastric fascia with Kocher clamps.  The fascia was grasped and elevated and incised sharply under direct vision.  Stay sutures were placed and the peritoneum was punctured bluntly with a Carmalt clamp.  Lissette trocar was introduced through the defect.  Pneumoperitoneum was applied.  The patient was placed in reverse Trendelenburg position and rolled slightly to her left.  Three further 5 mm ports were placed under direct vision in the epigastrium and right upper quadrant.  The gallbladder was visualized, grasped and elevated, the infundibulum was grasped and retracted laterally.  The fat and serosa along the edge of the infundibulum was incised with electrocautery and swept downwards.  This exposed the presumptive cystic duct and cystic artery.  The artery was bluntly surrounded and clipped and divided adjacent to the node of Calot.  This allowed us to open up a window adjacent to the cystic duct further.  We then placed a Aldana clamp across the low portion of the infundibulum and cannulated the duct adjacent to this.  The bile was aspirated back and this was subsequently flushed without leakage.  We performed cholangiogram without issue, there was a distal meniscus sign consistent with a retained stone in the distal common duct.  This was present despite flushing more than 60 mL of saline under pressure.  We then clipped and divided the cystic duct and removed our Aldana clamp and catheter.  The gallbladder was then dissected free from the gallbladder fossa with electrocautery and placed in an Endocatch pouch.  The right upper quadrant was irrigated and suctioned dry and found to be satisfactorily hemostatic.  We placed a #15 round Alonzo drain through our lateral most 5 mm port placing this slit portion up under the  liver to control any potential bile leak due to the retained stone.  The drain was anchored to the skin with a 2-0 nylon.  The remaining upper ports removed as was the Lissette and pneumoperitoneum was released.  The specimen was delivered through the fascial defect in the pouch and passed off for routine pathology.  We closed the fascia with an 0 Vicryl stitch in figure-of-eight fashion, stay sutures tied atop.  30 mL 0.5% Marcaine were distributed in all incisions.  Skin was closed with 4-0 Vicryls in a deep dermal interrupted fashion and skin glue was applied atop of this.  The patient tolerated the procedure well and was extubated and brought to recovery in excellent condition.  All sharps and sponge counts were correct at the conclusion of the case.         FILOMENA VENEGAS MD             D: 2017 14:04   T: 2017 14:52   MT: [id number]      Name:     ESTELLE CHRISTIE   MRN:      [id number]        Account:        [id number]   :      1975           Procedure Date: 2017      Document: [id number]       cc: Clayton Hendricks MD

## 2017-01-02 NOTE — ED NOTES
abd pain, bilat flank pain and uti sx since yesterday with NV.    Pt A&O x 3, CMS x 3, ABCD's adequate in triage

## 2017-01-03 ENCOUNTER — ANESTHESIA EVENT (OUTPATIENT)
Dept: SURGERY | Facility: CLINIC | Age: 42
End: 2017-01-03
Payer: COMMERCIAL

## 2017-01-03 ENCOUNTER — ANESTHESIA (OUTPATIENT)
Dept: SURGERY | Facility: CLINIC | Age: 42
End: 2017-01-03
Payer: COMMERCIAL

## 2017-01-03 ENCOUNTER — APPOINTMENT (OUTPATIENT)
Dept: INTERVENTIONAL RADIOLOGY/VASCULAR | Facility: CLINIC | Age: 42
End: 2017-01-03
Attending: INTERNAL MEDICINE
Payer: COMMERCIAL

## 2017-01-03 LAB
ALBUMIN SERPL-MCNC: 3 G/DL (ref 3.4–5)
ALP SERPL-CCNC: 105 U/L (ref 40–150)
ALT SERPL W P-5'-P-CCNC: 163 U/L (ref 0–50)
AMYLASE SERPL-CCNC: 38 U/L (ref 30–110)
AST SERPL W P-5'-P-CCNC: 98 U/L (ref 0–45)
BACTERIA SPEC CULT: NO GROWTH
BILIRUB DIRECT SERPL-MCNC: <0.1 MG/DL (ref 0–0.2)
BILIRUB SERPL-MCNC: 0.4 MG/DL (ref 0.2–1.3)
COPATH REPORT: NORMAL
ERCP: NORMAL
LIPASE SERPL-CCNC: 179 U/L (ref 73–393)
Lab: NORMAL
MICRO REPORT STATUS: NORMAL
PLATELET # BLD AUTO: 262 10E9/L (ref 150–450)
PROT SERPL-MCNC: 6.7 G/DL (ref 6.8–8.8)
SPECIMEN SOURCE: NORMAL

## 2017-01-03 PROCEDURE — 36415 COLL VENOUS BLD VENIPUNCTURE: CPT | Performed by: SURGERY

## 2017-01-03 PROCEDURE — 36000060 ZZH SURGERY LEVEL 3 W FLUORO 1ST 30 MIN: Performed by: INTERNAL MEDICINE

## 2017-01-03 PROCEDURE — 71000012 ZZH RECOVERY PHASE 1 LEVEL 1 FIRST HR: Performed by: INTERNAL MEDICINE

## 2017-01-03 PROCEDURE — 85049 AUTOMATED PLATELET COUNT: CPT | Performed by: INTERNAL MEDICINE

## 2017-01-03 PROCEDURE — 36415 COLL VENOUS BLD VENIPUNCTURE: CPT | Performed by: PHYSICIAN ASSISTANT

## 2017-01-03 PROCEDURE — 36000058 ZZH SURGERY LEVEL 3 EA 15 ADDTL MIN: Performed by: INTERNAL MEDICINE

## 2017-01-03 PROCEDURE — 25000125 ZZHC RX 250: Performed by: INTERNAL MEDICINE

## 2017-01-03 PROCEDURE — 25000132 ZZH RX MED GY IP 250 OP 250 PS 637: Performed by: INTERNAL MEDICINE

## 2017-01-03 PROCEDURE — 25500064 ZZH RX 255 OP 636: Performed by: INTERNAL MEDICINE

## 2017-01-03 PROCEDURE — 25000125 ZZHC RX 250: Performed by: NURSE ANESTHETIST, CERTIFIED REGISTERED

## 2017-01-03 PROCEDURE — 96375 TX/PRO/DX INJ NEW DRUG ADDON: CPT

## 2017-01-03 PROCEDURE — 40000067 ZZH STATISTIC ERCP (OR PROCEDURE): Performed by: INTERNAL MEDICINE

## 2017-01-03 PROCEDURE — 25000128 H RX IP 250 OP 636: Performed by: HOSPITALIST

## 2017-01-03 PROCEDURE — 80076 HEPATIC FUNCTION PANEL: CPT | Performed by: SURGERY

## 2017-01-03 PROCEDURE — 25800025 ZZH RX 258: Performed by: ANESTHESIOLOGY

## 2017-01-03 PROCEDURE — C1726 CATH, BAL DIL, NON-VASCULAR: HCPCS | Performed by: INTERNAL MEDICINE

## 2017-01-03 PROCEDURE — 27210995 ZZH RX 272: Performed by: INTERNAL MEDICINE

## 2017-01-03 PROCEDURE — 25000128 H RX IP 250 OP 636: Performed by: INTERNAL MEDICINE

## 2017-01-03 PROCEDURE — 37000008 ZZH ANESTHESIA TECHNICAL FEE, 1ST 30 MIN: Performed by: INTERNAL MEDICINE

## 2017-01-03 PROCEDURE — 37000009 ZZH ANESTHESIA TECHNICAL FEE, EACH ADDTL 15 MIN: Performed by: INTERNAL MEDICINE

## 2017-01-03 PROCEDURE — 25000125 ZZHC RX 250: Performed by: PHYSICIAN ASSISTANT

## 2017-01-03 PROCEDURE — 27210794 ZZH OR GENERAL SUPPLY STERILE: Performed by: INTERNAL MEDICINE

## 2017-01-03 PROCEDURE — 83690 ASSAY OF LIPASE: CPT | Performed by: PHYSICIAN ASSISTANT

## 2017-01-03 PROCEDURE — 96366 THER/PROPH/DIAG IV INF ADDON: CPT

## 2017-01-03 PROCEDURE — 36415 COLL VENOUS BLD VENIPUNCTURE: CPT | Performed by: INTERNAL MEDICINE

## 2017-01-03 PROCEDURE — 25000566 ZZH SEVOFLURANE, EA 15 MIN: Performed by: INTERNAL MEDICINE

## 2017-01-03 PROCEDURE — 40000306 ZZH STATISTIC PRE PROC ASSESS II: Performed by: INTERNAL MEDICINE

## 2017-01-03 PROCEDURE — G0378 HOSPITAL OBSERVATION PER HR: HCPCS

## 2017-01-03 PROCEDURE — C1769 GUIDE WIRE: HCPCS | Performed by: INTERNAL MEDICINE

## 2017-01-03 PROCEDURE — 82150 ASSAY OF AMYLASE: CPT | Performed by: PHYSICIAN ASSISTANT

## 2017-01-03 RX ORDER — PROCHLORPERAZINE MALEATE 5 MG
5-10 TABLET ORAL EVERY 6 HOURS PRN
Status: DISCONTINUED | OUTPATIENT
Start: 2017-01-03 | End: 2017-01-04 | Stop reason: HOSPADM

## 2017-01-03 RX ORDER — ACETAMINOPHEN 325 MG/1
650 TABLET ORAL EVERY 6 HOURS PRN
Status: DISCONTINUED | OUTPATIENT
Start: 2017-01-03 | End: 2017-01-04 | Stop reason: HOSPADM

## 2017-01-03 RX ORDER — NALOXONE HYDROCHLORIDE 0.4 MG/ML
.1-.4 INJECTION, SOLUTION INTRAMUSCULAR; INTRAVENOUS; SUBCUTANEOUS
Status: DISCONTINUED | OUTPATIENT
Start: 2017-01-03 | End: 2017-01-04 | Stop reason: HOSPADM

## 2017-01-03 RX ORDER — PROMETHAZINE HYDROCHLORIDE 25 MG/ML
6.25 INJECTION, SOLUTION INTRAMUSCULAR; INTRAVENOUS
Status: DISCONTINUED | OUTPATIENT
Start: 2017-01-03 | End: 2017-01-03 | Stop reason: HOSPADM

## 2017-01-03 RX ORDER — ONDANSETRON 4 MG/1
4 TABLET, ORALLY DISINTEGRATING ORAL EVERY 30 MIN PRN
Status: DISCONTINUED | OUTPATIENT
Start: 2017-01-03 | End: 2017-01-03 | Stop reason: HOSPADM

## 2017-01-03 RX ORDER — SODIUM CHLORIDE 9 MG/ML
INJECTION, SOLUTION INTRAVENOUS CONTINUOUS
Status: DISCONTINUED | OUTPATIENT
Start: 2017-01-03 | End: 2017-01-04 | Stop reason: HOSPADM

## 2017-01-03 RX ORDER — DEXAMETHASONE SODIUM PHOSPHATE 4 MG/ML
INJECTION, SOLUTION INTRA-ARTICULAR; INTRALESIONAL; INTRAMUSCULAR; INTRAVENOUS; SOFT TISSUE PRN
Status: DISCONTINUED | OUTPATIENT
Start: 2017-01-03 | End: 2017-01-03

## 2017-01-03 RX ORDER — FENTANYL CITRATE 50 UG/ML
25-50 INJECTION, SOLUTION INTRAMUSCULAR; INTRAVENOUS
Status: DISCONTINUED | OUTPATIENT
Start: 2017-01-03 | End: 2017-01-03 | Stop reason: HOSPADM

## 2017-01-03 RX ORDER — MEPERIDINE HYDROCHLORIDE 25 MG/ML
12.5 INJECTION INTRAMUSCULAR; INTRAVENOUS; SUBCUTANEOUS
Status: DISCONTINUED | OUTPATIENT
Start: 2017-01-03 | End: 2017-01-03 | Stop reason: HOSPADM

## 2017-01-03 RX ORDER — FLUMAZENIL 0.1 MG/ML
0.2 INJECTION, SOLUTION INTRAVENOUS
Status: ACTIVE | OUTPATIENT
Start: 2017-01-03 | End: 2017-01-04

## 2017-01-03 RX ORDER — SODIUM CHLORIDE, SODIUM LACTATE, POTASSIUM CHLORIDE, CALCIUM CHLORIDE 600; 310; 30; 20 MG/100ML; MG/100ML; MG/100ML; MG/100ML
1000 INJECTION, SOLUTION INTRAVENOUS CONTINUOUS
Status: DISCONTINUED | OUTPATIENT
Start: 2017-01-03 | End: 2017-01-03 | Stop reason: HOSPADM

## 2017-01-03 RX ORDER — OXYCODONE HYDROCHLORIDE 5 MG/1
5-10 TABLET ORAL
Qty: 30 TABLET | Refills: 0 | Status: SHIPPED | OUTPATIENT
Start: 2017-01-03

## 2017-01-03 RX ORDER — ALBUTEROL SULFATE 0.83 MG/ML
2.5 SOLUTION RESPIRATORY (INHALATION) EVERY 4 HOURS PRN
Status: DISCONTINUED | OUTPATIENT
Start: 2017-01-03 | End: 2017-01-03 | Stop reason: HOSPADM

## 2017-01-03 RX ORDER — ONDANSETRON 2 MG/ML
4 INJECTION INTRAMUSCULAR; INTRAVENOUS EVERY 30 MIN PRN
Status: DISCONTINUED | OUTPATIENT
Start: 2017-01-03 | End: 2017-01-03 | Stop reason: HOSPADM

## 2017-01-03 RX ORDER — INDOMETHACIN 50 MG/1
100 SUPPOSITORY RECTAL
Status: COMPLETED | OUTPATIENT
Start: 2017-01-03 | End: 2017-01-03

## 2017-01-03 RX ORDER — HYDROMORPHONE HYDROCHLORIDE 1 MG/ML
.3-.5 INJECTION, SOLUTION INTRAMUSCULAR; INTRAVENOUS; SUBCUTANEOUS
Status: DISCONTINUED | OUTPATIENT
Start: 2017-01-03 | End: 2017-01-04 | Stop reason: HOSPADM

## 2017-01-03 RX ORDER — GLYCOPYRROLATE 0.2 MG/ML
INJECTION, SOLUTION INTRAMUSCULAR; INTRAVENOUS PRN
Status: DISCONTINUED | OUTPATIENT
Start: 2017-01-03 | End: 2017-01-03

## 2017-01-03 RX ORDER — PROPOFOL 10 MG/ML
INJECTION, EMULSION INTRAVENOUS PRN
Status: DISCONTINUED | OUTPATIENT
Start: 2017-01-03 | End: 2017-01-03

## 2017-01-03 RX ORDER — PROCHLORPERAZINE 25 MG
25 SUPPOSITORY, RECTAL RECTAL EVERY 12 HOURS PRN
Status: DISCONTINUED | OUTPATIENT
Start: 2017-01-03 | End: 2017-01-04 | Stop reason: HOSPADM

## 2017-01-03 RX ORDER — OXYCODONE HYDROCHLORIDE 5 MG/1
5-10 TABLET ORAL
Status: DISCONTINUED | OUTPATIENT
Start: 2017-01-03 | End: 2017-01-04 | Stop reason: HOSPADM

## 2017-01-03 RX ORDER — SODIUM CHLORIDE, SODIUM LACTATE, POTASSIUM CHLORIDE, CALCIUM CHLORIDE 600; 310; 30; 20 MG/100ML; MG/100ML; MG/100ML; MG/100ML
INJECTION, SOLUTION INTRAVENOUS CONTINUOUS
Status: DISCONTINUED | OUTPATIENT
Start: 2017-01-03 | End: 2017-01-03 | Stop reason: HOSPADM

## 2017-01-03 RX ORDER — HYDROMORPHONE HYDROCHLORIDE 1 MG/ML
.3-.5 INJECTION, SOLUTION INTRAMUSCULAR; INTRAVENOUS; SUBCUTANEOUS EVERY 10 MIN PRN
Status: DISCONTINUED | OUTPATIENT
Start: 2017-01-03 | End: 2017-01-03 | Stop reason: HOSPADM

## 2017-01-03 RX ORDER — LIDOCAINE HYDROCHLORIDE 10 MG/ML
INJECTION, SOLUTION INFILTRATION; PERINEURAL PRN
Status: DISCONTINUED | OUTPATIENT
Start: 2017-01-03 | End: 2017-01-03

## 2017-01-03 RX ORDER — LIDOCAINE 40 MG/G
CREAM TOPICAL
Status: DISCONTINUED | OUTPATIENT
Start: 2017-01-03 | End: 2017-01-03 | Stop reason: HOSPADM

## 2017-01-03 RX ORDER — FENTANYL CITRATE 50 UG/ML
INJECTION, SOLUTION INTRAMUSCULAR; INTRAVENOUS PRN
Status: DISCONTINUED | OUTPATIENT
Start: 2017-01-03 | End: 2017-01-03

## 2017-01-03 RX ORDER — ONDANSETRON 2 MG/ML
4 INJECTION INTRAMUSCULAR; INTRAVENOUS EVERY 6 HOURS PRN
Status: DISCONTINUED | OUTPATIENT
Start: 2017-01-03 | End: 2017-01-04 | Stop reason: HOSPADM

## 2017-01-03 RX ORDER — NALOXONE HYDROCHLORIDE 0.4 MG/ML
.1-.4 INJECTION, SOLUTION INTRAMUSCULAR; INTRAVENOUS; SUBCUTANEOUS
Status: DISCONTINUED | OUTPATIENT
Start: 2017-01-03 | End: 2017-01-03 | Stop reason: HOSPADM

## 2017-01-03 RX ORDER — NEOSTIGMINE METHYLSULFATE 1 MG/ML
VIAL (ML) INJECTION PRN
Status: DISCONTINUED | OUTPATIENT
Start: 2017-01-03 | End: 2017-01-03

## 2017-01-03 RX ADMIN — AMPICILLIN SODIUM AND SULBACTAM SODIUM 3 G: 2; 1 INJECTION, POWDER, FOR SOLUTION INTRAMUSCULAR; INTRAVENOUS at 04:52

## 2017-01-03 RX ADMIN — SODIUM CHLORIDE: 9 INJECTION, SOLUTION INTRAVENOUS at 18:39

## 2017-01-03 RX ADMIN — FENTANYL CITRATE 150 MCG: 50 INJECTION, SOLUTION INTRAMUSCULAR; INTRAVENOUS at 11:34

## 2017-01-03 RX ADMIN — GLYCOPYRROLATE 0.2 MG: 0.2 INJECTION, SOLUTION INTRAMUSCULAR; INTRAVENOUS at 11:34

## 2017-01-03 RX ADMIN — DEXAMETHASONE SODIUM PHOSPHATE 4 MG: 4 INJECTION, SOLUTION INTRAMUSCULAR; INTRAVENOUS at 11:34

## 2017-01-03 RX ADMIN — PROPOFOL 150 MG: 10 INJECTION, EMULSION INTRAVENOUS at 11:34

## 2017-01-03 RX ADMIN — ACETAMINOPHEN 650 MG: 325 TABLET, FILM COATED ORAL at 00:15

## 2017-01-03 RX ADMIN — HYDROMORPHONE HYDROCHLORIDE 0.5 MG: 10 INJECTION, SOLUTION INTRAMUSCULAR; INTRAVENOUS; SUBCUTANEOUS at 07:01

## 2017-01-03 RX ADMIN — HYDROMORPHONE HYDROCHLORIDE 0.5 MG: 10 INJECTION, SOLUTION INTRAMUSCULAR; INTRAVENOUS; SUBCUTANEOUS at 02:29

## 2017-01-03 RX ADMIN — PROCHLORPERAZINE EDISYLATE 10 MG: 5 INJECTION INTRAMUSCULAR; INTRAVENOUS at 19:48

## 2017-01-03 RX ADMIN — Medication 3 MG: at 12:03

## 2017-01-03 RX ADMIN — HYDROMORPHONE HYDROCHLORIDE 0.5 MG: 1 INJECTION, SOLUTION INTRAMUSCULAR; INTRAVENOUS; SUBCUTANEOUS at 17:34

## 2017-01-03 RX ADMIN — ONDANSETRON 4 MG: 2 INJECTION INTRAMUSCULAR; INTRAVENOUS at 17:39

## 2017-01-03 RX ADMIN — GLYCOPYRROLATE 0.4 MG: 0.2 INJECTION, SOLUTION INTRAMUSCULAR; INTRAVENOUS at 12:03

## 2017-01-03 RX ADMIN — MIDAZOLAM HYDROCHLORIDE 2 MG: 1 INJECTION, SOLUTION INTRAMUSCULAR; INTRAVENOUS at 11:29

## 2017-01-03 RX ADMIN — SODIUM CHLORIDE: 900 INJECTION INTRAVENOUS at 02:52

## 2017-01-03 RX ADMIN — ROCURONIUM BROMIDE 30 MG: 10 INJECTION INTRAVENOUS at 11:34

## 2017-01-03 RX ADMIN — HYDROMORPHONE HYDROCHLORIDE 0.5 MG: 10 INJECTION, SOLUTION INTRAMUSCULAR; INTRAVENOUS; SUBCUTANEOUS at 04:52

## 2017-01-03 RX ADMIN — HYDROMORPHONE HYDROCHLORIDE 0.5 MG: 10 INJECTION, SOLUTION INTRAMUSCULAR; INTRAVENOUS; SUBCUTANEOUS at 09:26

## 2017-01-03 RX ADMIN — HYDROMORPHONE HYDROCHLORIDE 0.5 MG: 1 INJECTION, SOLUTION INTRAMUSCULAR; INTRAVENOUS; SUBCUTANEOUS at 14:43

## 2017-01-03 RX ADMIN — SODIUM CHLORIDE, POTASSIUM CHLORIDE, SODIUM LACTATE AND CALCIUM CHLORIDE: 600; 310; 30; 20 INJECTION, SOLUTION INTRAVENOUS at 12:04

## 2017-01-03 RX ADMIN — LIDOCAINE HYDROCHLORIDE 30 MG: 10 INJECTION, SOLUTION INFILTRATION; PERINEURAL at 11:34

## 2017-01-03 RX ADMIN — SODIUM CHLORIDE, POTASSIUM CHLORIDE, SODIUM LACTATE AND CALCIUM CHLORIDE: 600; 310; 30; 20 INJECTION, SOLUTION INTRAVENOUS at 11:29

## 2017-01-03 RX ADMIN — HYDROMORPHONE HYDROCHLORIDE 0.5 MG: 1 INJECTION, SOLUTION INTRAMUSCULAR; INTRAVENOUS; SUBCUTANEOUS at 22:08

## 2017-01-03 ASSESSMENT — LIFESTYLE VARIABLES: TOBACCO_USE: 1

## 2017-01-03 NOTE — PROGRESS NOTES
Surgery-[facility]  POD#1 s/p lap traci with cholangiograms for acute cholecystitis with CBD stone  Feels okay but is sore near drain site and in epigastrium.  Did tolerate clears last PM  No fevers, VSS  35 from NAHED  LFTs drawn/pending  NAD, resting, comfortable, no icterus  Abd soft, some epigastric tenderness, NAHED serosanguinous, rare bowel tones.  GI consulted for CBD stone, timing of ERCP still TBD  NPO until plan established  Drain may be removed after stone cleared and tolerates diet.

## 2017-01-03 NOTE — ANESTHESIA CARE TRANSFER NOTE
Patient: Arcelia Odom    ENDOSCOPIC RETROGRADE CHOLANGIOPANCREATOGRAM (N/A Mouth)  Additional InformationProcedure(s):  ENDOSCOPIC RETROGRADE CHOLANGIOPANCREATOGRAM with Sphinctorotomy with Stone Extraction.  - Wound Class: II-Clean Contaminated    Diagnosis: Cholecystitis   Diagnosis Additional Information: No value filed.    Anesthesia Type:   ETT     Note:    Patient transferred to:PACU  Comments: Pt spont resps oral airway suctioned ETT removed to PACU VSS Report to RN      Vitals: (Last set prior to Anesthesia Care Transfer)              Electronically Signed By: VARUN Herbert CRNA  January 3, 2017  12:13 PM

## 2017-01-03 NOTE — DISCHARGE INSTRUCTIONS
HOME CARE FOLLOWING LAPAROSCOPIC CHOLECYSTECTOMY  MAHOGANY Hernandez, MARIETTA Perez, RAMONITA Gillis, CLAU Clark    INCISIONAL CARE:  Replace the bandage over your incisions until all drainage stops, or if more comfortable to have in place.  If present, leave the steri-strips (white paper tapes) in place till they fall off.  If Dermabond (a type of skin glue) is present, leave in place until it wears/flakes off.     BATHING:  Avoid baths for 1 week after surgery.  Showers are okay.  You may wash your hair at any time.  Gently pat your incisions dry after bathing.    ACTIVITY:  Light Activity -- you may immediately be up and about as tolerated.  Driving -- you may drive when comfortable and off narcotic pain medications.  Light Work -- resume when comfortable off pain medications.  (If you can drive, you probably can work.)  Strenuous Work/Activity -- limit lifting to 20 pounds for 1 week.  Progressively increase with time.  Active Sports (running, biking, etc.) -- cautiously resume after 2 weeks.    DISCOMFORT:  Use pain medications as prescribed by your surgeon.  Take the pain medication with some food, when possible, to minimize side effects.  Intermittent use of ice packs at the incision sites may help during the first 48 hours.  Expect gradual improvement.  You may experience shoulder pain, which is due to the air placed within your abdomen during the procedure.  This is temporary and usually passes within 2 days.    DIET:  Drink plenty of fluids.  While taking pain medications, increase dietary fiber or add a fiber supplementation like Metamucil or Citrucel to help prevent constipation - a possible side effect of pain medications.  It is not uncommon to experience some bowel changes (loose stools or constipation) after surgery.  Your body has to adapt to you no longer having a gall bladder.  To help minimize this side effect, avoid fatty foods for the first week after surgery.  You may  then slowly increase the amount of fatty foods in your diet.      NAUSEA:  If nauseated from the anesthetic/pain meds; rest in bed, get up cautiously with assistance, and drink clear liquids (juice, tea, broth).    RETURN APPOINTMENT:  Schedule a follow-up visit 1-3 weeks post-op.  Office Phone:  [phone]     CONTACT US IF THE FOLLOWING DEVELOPS:   1. A fever that is above 101     2. If there is a large amount of drainage, bleeding, or swelling.   3. Severe pain that is not relieved by your prescription.   4. Drainage that is thick, cloudy, yellow, green or white.   5. Any other questions not answered by  Frequently Asked Questions  sheet.      FREQUENTLY ASKED QUESTIONS:    Q:  How should my incision look?    A:  Normally your incision will appear slightly swollen with light redness directly along the incision itself as it heals.  It may feel like a bump or ridge as the healing/scarring happens, and over time (3-4 months) this bump or ridge feeling should slowly go away.  In general, clear or pink watery drainage can be normal at first as your incision heals, but should decrease over time.    Q:  How do I know if my incision is infected?  A:  Look at your incision for signs of infection, like redness around the incision spreading to surrounding skin, or drainage of cloudy or foul-smelling drainage.  If you feel warm, check your temperature to see if you are running a fever.    **If any of these things occur, please notify the nurse at our office.  We may need you to come into the office for an incision check.      Q:  How do I take care of my incision?  A:  If you have a dressing in place - Starting the day after surgery, replace the dressing 1-2 times a day until there is no further drainage from the incision.  At that time, a dressing is no longer needed.  Try to minimize tape on the skin if irritation is occurring at the tape sites.  If you have significant irritation from tape on the skin, please call the  office to discuss other method of dressing your incision.    Small pieces of tape called  steri-strips  may be present directly overlying your incision; these may be removed 10 days after surgery unless otherwise specified by your surgeon.  If these tapes start to loosen at the ends, you may trim them back until they fall off or are removed.    A:  If you had  Dermabond  tissue glue used as a dressing (this causes your incision to look shiny with a clear covering over it) - This type of dressing wears off with time and does not require more dressings over the top unless it is draining around the glue as it wears off.  Do not apply ointments or lotions over the incisions until the glue has completely worn off.    Q:  There is a piece of tape or a sticky  lead  still on my skin.  Can I remove this?  A:  Sometimes the sticky  leads  used for monitoring during surgery or for evaluation in the emergency department are not all removed while you are in the hospital.  These sometimes have a tab or metal dot on them.  You can easily remove these on your own, like taking off a band-aid.  If there is a gel substance under the  lead , simply wipe/clean it off with a washcloth or paper towel.      Q:  What can I do to minimize constipation (very hard stools, or lack of stools)?  A:  Stay well hydrated.  Increase your dietary fiber intake or take a fiber supplement -with plenty of water.  Walk around frequently.  You may consider an over-the-counter stool-softener.  Your Pharmacist can assist you with choosing one that is stocked at your pharmacy.  Constipation is also one of the most common side effects of pain medication.  If you are using pain medication, be pro-active and try to PREVENT problems with constipation by taking the steps above BEFORE constipation becomes a problem.    Q:  What do I do if I need more pain medications?  A:  Call the office to receive refills.  Be aware that certain pain meds cannot be called into a  pharmacy and actually require a paper prescription.  A change may be made in your pain med as you progress thru your recovery period or if you have side effects to certain meds.    --Pain meds are NOT refilled after 5pm on weekdays, and NOT AT ALL on the weekends, so please look ahead to prevent problems.      Q:  Why am I having a hard time sleeping now that I am at home?  A:  Many medications you receive while you are in the hospital can impact your sleep for a number of days after your surgery/hospitalization.  Decreased level of activity and naps during the day may also make sleeping at night difficult.  Try to minimize day-time naps, and get up frequently during the day to walk around your home during your recovery time.  Sleep aides may be of some help, but are not recommended for long-term use.      Q:  I am having some back discomfort.  What should I do?  A:  This may be related to certain positioning that was required for your surgery, extended periods of time in bed, or other changes in your overall activity level.  You may try ice, heat, acetaminophen, or ibuprofen to treat this temporarily.  Note that many pain medications have acetaminophen in them and would state this on the prescription bottle.  Be sure not to exceed the maximum of 4000mg per day of acetaminophen.     **If the pain you are having does not resolve, is severe, or is a flare of back pain you have had on other occasions prior to surgery, please contact your primary physician for further recommendations or for an appointment to be examined at their office.    Q:  Why am I having headaches?  A:  Headaches can be caused by many things:  caffeine withdrawal, use of pain meds, dehydration, high blood pressure, lack of sleep, over-activity/exhaustion, flare-up of usual migraine headaches.  If you feel this is related to muscle tension (a band-like feeling around the head, or a pressure at the low-back of the head) you may try ice or heat to  this area.  You may need to drink more fluids (try electrolyte drink like Gatorade), rest, or take your usual migraine medications.   **If your headaches do not resolve, worsen, are accompanied by other symptoms, or if your blood pressure is high, please call your primary physician for recommendation and/or examination.    Q:  I am unable to urinate.  What do I do?  A:  A small percentage of people can have difficulty urinating initially after surgery.  This includes being able to urinate only a very small amount at a time and feeling discomfort or pressure in the very low abdomen.  This is called  urinary retention , and is actually an urgent situation.  Proceed to your nearest Emergency department for evaluation (not an Urgent Care Center).  Sometimes the bladder does not work correctly after certain medications you receive during surgery, or related to certain procedures.  You may need to have a catheter placed until your bladder recovers.  When planning to go to an Emergency department, it may help to call the ER to let them know you are coming in for this problem after a surgery.  This may help you get in quicker to be evaluated.  **If you have symptoms of a urinary tract infection, please contact your primary physician for the proper evaluation and treatment.          If you have other questions, please call the office Monday thru Friday between 8am and 5pm to discuss with the nurse or physician assistant.  #[phone]    There is a surgeon ON CALL on weekday evenings and over the weekend in case of urgent need only, and may be contacted at the same number.    If you are having an emergency, call 911 or proceed to your nearest emergency department.

## 2017-01-03 NOTE — ANESTHESIA PREPROCEDURE EVALUATION
Anesthesia Evaluation     .        ROS/MED HX    ENT/Pulmonary:     (+)tobacco use, Current use , . .    Neurologic:  - neg neurologic ROS     Cardiovascular:  - neg cardiovascular ROS       METS/Exercise Tolerance:     Hematologic:  - neg hematologic  ROS       Musculoskeletal:  - neg musculoskeletal ROS       GI/Hepatic:  - neg GI/hepatic ROS       Renal/Genitourinary:  - ROS Renal section negative       Endo:  - neg endo ROS       Psychiatric:  - neg psychiatric ROS       Infectious Disease:  - neg infectious disease ROS       Malignancy:         Other: Comment: .Lab Test        01/02/17                       0020          WBC          9.5           HGB          13.9          MCV          84            PLT          290            Lab Test        01/02/17                       0020          NA           137           POTASSIUM    3.4           CHLORIDE     102           CO2          27            BUN          11            CR           0.73          ANIONGAP     8             JEOVANY          9.8           GLC          105*             - neg other ROS           Physical Exam  Normal systems: cardiovascular and pulmonary    Airway   Mallampati: II    Dental     Cardiovascular   Rhythm and rate: regular and normal      Pulmonary    breath sounds clear to auscultation                    Anesthesia Plan      History & Physical Review  History and physical reviewed and following examination; no interval change.    ASA Status:  2 .        Plan for ETT with Intravenous induction. Maintenance will be Inhalation and Balanced.           Postoperative Care      Consents  Anesthetic plan, risks, benefits and alternatives discussed with:  Patient or representative..                          .

## 2017-01-03 NOTE — PLAN OF CARE
Problem: Goal Outcome Summary  Goal: Goal Outcome Summary  Outcome: No Change  VS stable, alert and orientated, up with SBA, went down for ERCP, returned to floor around 1345pm, very hungry, only allowed a clear liquid diet(pt. Aware of diet order, wanted more to eat)  tolerating, BS not audible, not passing any flatus, having increased pain in abdomen (comes in waves, sharp shooting, needing IV pain medications, NAHED drain to rt. Side, Surgery and GI following

## 2017-01-03 NOTE — PROGRESS NOTES
I had a short discussion with the patient about her procedure and she understands that there is a small risk for perforation,bleeding, and infection. She will be getting the ERCP shortly by Dr. Ceron. Thank you.

## 2017-01-03 NOTE — ANESTHESIA POSTPROCEDURE EVALUATION
Patient: Arcelia Odom    ENDOSCOPIC RETROGRADE CHOLANGIOPANCREATOGRAM (N/A Mouth)  Additional InformationProcedure(s):  ENDOSCOPIC RETROGRADE CHOLANGIOPANCREATOGRAM with Sphinctorotomy with Stone Extraction.  - Wound Class: II-Clean Contaminated    Diagnosis:Cholecystitis   Diagnosis Additional Information: Cholecystitis , common bile duct stone    Anesthesia Type:  ETT    Note:  Anesthesia Post Evaluation    Patient location during evaluation: PACU  Patient participation: Able to fully participate in evaluation  Level of consciousness: awake  Pain management: adequate  Airway patency: patent  Cardiovascular status: acceptable  Respiratory status: acceptable  Hydration status: acceptable  PONV: controlled     Anesthetic complications: None          Last vitals:  Filed Vitals:    01/03/17 1338 01/03/17 1408 01/03/17 1443   BP: 134/83 136/77 131/88   Pulse:      Temp:  97.8  F (36.6  C)    Resp: 18 16 16   SpO2: 92% 92% 91%       Electronically Signed By: Patrick Tineo DO  January 3, 2017  3:47 PM

## 2017-01-03 NOTE — PROGRESS NOTES
Pt continues to void frequently every hour and reports a foul smell to it. UA in ER is neg. MD notified that PVR was 400. MD stated to straight cath pt if PVR continues to be above 400ml. Will continue to monitor.

## 2017-01-03 NOTE — PROGRESS NOTES
"[facility]  Hospitalist Progress Note  Mita Marr PA-C 01/03/2017    Reason for Stay (Diagnosis): POD #1 s/p lap traci w/ CBD stone noted on intra-op cholangiogram         Assessment and Plan:      Summary of Stay: Arcelia Odom is a 41 year old female admitted on 1/1/2017 with cholecystitis POD #1 s/p laparoscopic cholecystectomy w/ evidence on intra-op cholangiogram of a filling defect in the distal CBD consistent w/ a partially obstructing stone.         Problem List:   1. POD #1 s/p lap traci: Patient followed by surgery team, seen by Dr. Sol this AM.  LFTs are trending down.  No fevers overnight and VSS. NAHED drain in place w/ serosanguinous drainage, per surgery drain can be removed after stone cleared and tolerates diet.   2. Obstructing distal CBD stone: Patient is NPO.  GI consulted, patient and  just spoke w/ Dr. Valverde.  Patient on schedule for ERCP at 10:10 this AM.   If she tolerates diet after procedure and there is not any significant pain suggestive of post-op pancreatitis, she could likely discharge home later today.  3.   DVT Prophylaxis: Pneumatic Compression Devices  4.   Code Status: Full Code  5.   Discharge Dispo: Anticipate home later today or tomorrow.  6.   Estimated Disch Date / # of Days until Disch: 0-1        Interval History (Subjective):      Patient is doing well, NPO for scheduled ERCP w/ Dr. Ceron this AM.  Patient just spoke with Dr. Valverde regarding the procedure.  Pain adequately controlled w/ prn analgesics, does report some epigastric and jaxson-incisional soreness, as to be expected.  Denies fever, chills, nausea, or vomiting.  No chest pain, shortness of breath, or extremity pain or swelling.  She is eager to discharge home today if possible.                  Physical Exam:      Last Vital Signs:  /73 mmHg  Pulse 68  Temp(Src) 97.5  F (36.4  C) (Oral)  Resp 16  Ht 1.626 m (5' 4\")  Wt 69.6 kg (153 lb 7 oz)  BMI 26.32 kg/m2 "  SpO2 92%  LMP 12/26/2016 (Exact Date)      Intake/Output Summary (Last 24 hours) at 01/03/17 0920  Last data filed at 01/03/17 0700   Gross per 24 hour   Intake 4253.33 ml   Output   1780 ml   Net 2473.33 ml       Constitutional: Awake, alert, cooperative, no apparent distress   Respiratory: Clear to auscultation bilaterally, no crackles or wheezing   Cardiovascular: Regular rate and rhythm, normal S1 and S2, and no murmur noted   Abdomen: Normal bowel sounds, soft, non-distended, non-tender   Skin: No rashes, no cyanosis, dry to touch   Neuro: Alert and oriented x3, no weakness, numbness, memory loss   Extremities: No edema, normal range of motion   Other(s):        All other systems: Negative          Medications:      All current medications were reviewed with changes reflected in problem list.         Data:      All new lab and imaging data was reviewed.   Labs & Imaging:  Results for orders placed or performed during the hospital encounter of 01/01/17 (from the past 24 hour(s))   XR Surgery NELL L/T 5 Min Fluoro w Stills    Narrative    INTRAOPERATIVE CHOLANGIOGRAM 1/2/2017 1:16 PM     HISTORY: Laparoscopic cholecystectomy.    FLUOROSCOPY TIME: 26 seconds.    IMAGES: A total of six spot fluoro and/or cine loops are obtained  during exam.      Impression    IMPRESSION: C-arm images are performed during intraoperative  cholangiogram following laparoscopic cholecystectomy. Contrast  injection is performed through the cystic duct remnant. Contrast  readily fills the central intrahepatic ducts, common hepatic duct and  common bile duct. A single filling defect is noted in the distal  common bile duct consistent with an obstructing stone. Injected  contrast does reach the duodenum; therefore, a complete duct  obstruction is doubtful. Contrast readily flows into the duodenum  without delay.    Findings relayed to the surgeon at the time of the exam.    CHARLIE BENSON MD   Hepatic panel   Result Value Ref Range     Bilirubin Direct <0.1 0.0 - 0.2 mg/dL    Bilirubin Total 0.4 0.2 - 1.3 mg/dL    Albumin 3.0 (L) 3.4 - 5.0 g/dL    Protein Total 6.7 (L) 6.8 - 8.8 g/dL    Alkaline Phosphatase 105 40 - 150 U/L     (H) 0 - 50 U/L    AST 98 (H) 0 - 45 U/L     ADDENDUM: ERCP showed sludge, no stone. Patient complaining of continued pain but nothing worsened.  She is tolerating clear liquids this evening and per ERCP note will continue on clear liquids for today and advance as tolerated tomorrow.  Stat lipase was ordered as nurse contacted saying patient was having significant pain and didn't have any pain meds ordered after the procedure, but on speaking with the patient this is the same pain from prior to the ERCP today.    Mita Marr, PAC

## 2017-01-03 NOTE — PLAN OF CARE
Problem: Goal Outcome Summary  Goal: Goal Outcome Summary  Outcome: No Change  Pt slept on and off this shift. VSS AO. Pt ratin pain 6-7/10 being treated with IV dilaudid. Pt straight cathed at beginning of shift with 675ml out. Pt transfers with ax1 to the BR. NAHED drain patent with 15 ml serosang. Drainage. Incisions cdi. Pt was able to void x1 with post void bladder scan showing 391. Pt voided 275 this am. Pt denies passing flatus. Pt been NPO since midnight for possible ERCP today.

## 2017-01-03 NOTE — PLAN OF CARE
Problem: Individualization  Goal: Patient Preferences  Outcome: No Change  Pt A/O, C/O abdominal pain, Medicated with Dilaudid and using ice. HR reg, pulses palpable, SCD s in place, no edema. VSS. RA, no SOB, lungs clear with dim bases, sats adequate. BS hypoactive, no N/V, tolerating sips of clears, NPO at MN for gastro consult and possible ERCP, denies passing gas. Voiding in BR with SBA frequently, bladder scan 400, MD notified. Dressing to abdomen CDI with drain with small amount of bloody drainage. IVF infusing. AM labs. Gastro to see in AM.

## 2017-01-04 VITALS
SYSTOLIC BLOOD PRESSURE: 123 MMHG | DIASTOLIC BLOOD PRESSURE: 81 MMHG | TEMPERATURE: 97.5 F | WEIGHT: 153.44 LBS | HEIGHT: 64 IN | RESPIRATION RATE: 16 BRPM | BODY MASS INDEX: 26.2 KG/M2 | HEART RATE: 68 BPM | OXYGEN SATURATION: 94 %

## 2017-01-04 LAB
ALBUMIN SERPL-MCNC: 2.9 G/DL (ref 3.4–5)
ALP SERPL-CCNC: 152 U/L (ref 40–150)
ALT SERPL W P-5'-P-CCNC: 460 U/L (ref 0–50)
ANION GAP SERPL CALCULATED.3IONS-SCNC: 10 MMOL/L (ref 3–14)
AST SERPL W P-5'-P-CCNC: 581 U/L (ref 0–45)
BILIRUB DIRECT SERPL-MCNC: 0.7 MG/DL (ref 0–0.2)
BILIRUB SERPL-MCNC: 1.3 MG/DL (ref 0.2–1.3)
BUN SERPL-MCNC: 10 MG/DL (ref 7–30)
CALCIUM SERPL-MCNC: 7.8 MG/DL (ref 8.5–10.1)
CHLORIDE SERPL-SCNC: 106 MMOL/L (ref 94–109)
CO2 SERPL-SCNC: 23 MMOL/L (ref 20–32)
CREAT SERPL-MCNC: 0.74 MG/DL (ref 0.52–1.04)
ERYTHROCYTE [DISTWIDTH] IN BLOOD BY AUTOMATED COUNT: 12 % (ref 10–15)
GFR SERPL CREATININE-BSD FRML MDRD: 86 ML/MIN/1.7M2
GLUCOSE SERPL-MCNC: 73 MG/DL (ref 70–99)
HCT VFR BLD AUTO: 38.2 % (ref 35–47)
HGB BLD-MCNC: 13.1 G/DL (ref 11.7–15.7)
LIPASE SERPL-CCNC: 793 U/L (ref 73–393)
MCH RBC QN AUTO: 29 PG (ref 26.5–33)
MCHC RBC AUTO-ENTMCNC: 34.3 G/DL (ref 31.5–36.5)
MCV RBC AUTO: 85 FL (ref 78–100)
PLATELET # BLD AUTO: 236 10E9/L (ref 150–450)
POTASSIUM SERPL-SCNC: 3.6 MMOL/L (ref 3.4–5.3)
PROT SERPL-MCNC: 6.5 G/DL (ref 6.8–8.8)
RBC # BLD AUTO: 4.52 10E12/L (ref 3.8–5.2)
SODIUM SERPL-SCNC: 139 MMOL/L (ref 133–144)
WBC # BLD AUTO: 8 10E9/L (ref 4–11)

## 2017-01-04 PROCEDURE — 25000125 ZZHC RX 250: Performed by: PHYSICIAN ASSISTANT

## 2017-01-04 PROCEDURE — 36415 COLL VENOUS BLD VENIPUNCTURE: CPT | Performed by: HOSPITALIST

## 2017-01-04 PROCEDURE — G0378 HOSPITAL OBSERVATION PER HR: HCPCS

## 2017-01-04 PROCEDURE — 80076 HEPATIC FUNCTION PANEL: CPT | Performed by: HOSPITALIST

## 2017-01-04 PROCEDURE — 99217 ZZC OBSERVATION CARE DISCHARGE: CPT | Performed by: HOSPITALIST

## 2017-01-04 PROCEDURE — 25000128 H RX IP 250 OP 636: Performed by: HOSPITALIST

## 2017-01-04 PROCEDURE — 80048 BASIC METABOLIC PNL TOTAL CA: CPT | Performed by: HOSPITALIST

## 2017-01-04 PROCEDURE — 85027 COMPLETE CBC AUTOMATED: CPT | Performed by: HOSPITALIST

## 2017-01-04 PROCEDURE — 36415 COLL VENOUS BLD VENIPUNCTURE: CPT | Performed by: INTERNAL MEDICINE

## 2017-01-04 PROCEDURE — 80076 HEPATIC FUNCTION PANEL: CPT | Performed by: INTERNAL MEDICINE

## 2017-01-04 PROCEDURE — 83690 ASSAY OF LIPASE: CPT | Performed by: HOSPITALIST

## 2017-01-04 RX ADMIN — HYDROMORPHONE HYDROCHLORIDE 0.3 MG: 1 INJECTION, SOLUTION INTRAMUSCULAR; INTRAVENOUS; SUBCUTANEOUS at 09:16

## 2017-01-04 RX ADMIN — HYDROMORPHONE HYDROCHLORIDE 0.5 MG: 1 INJECTION, SOLUTION INTRAMUSCULAR; INTRAVENOUS; SUBCUTANEOUS at 01:06

## 2017-01-04 RX ADMIN — SODIUM CHLORIDE: 9 INJECTION, SOLUTION INTRAVENOUS at 03:19

## 2017-01-04 RX ADMIN — HYDROMORPHONE HYDROCHLORIDE 0.5 MG: 1 INJECTION, SOLUTION INTRAMUSCULAR; INTRAVENOUS; SUBCUTANEOUS at 05:37

## 2017-01-04 ASSESSMENT — PAIN DESCRIPTION - DESCRIPTORS: DESCRIPTORS: THROBBING

## 2017-01-04 NOTE — PROVIDER NOTIFICATION
Critical lab value received, .  Reported to JUSTIN Stack.  Pt. To follow up with PCP regarding recheck of these labs post discharge.

## 2017-01-04 NOTE — PLAN OF CARE
"Problem: Discharge Planning  Goal: Discharge Planning (Adult, OB, Behavioral, Peds)  Outcome: Adequate for Discharge Date Met:  01/04/17  Pt. Seen by GI, Surgery and PA with hospitalist service.  NAHED drain removed per PA with surgery.  ABD with lap sites intact with dermabond.  +BS, +flatus.  Pt.up walking hallway.   Tolerated a low fat breakfast.  No nausea, no increase in pain.  Pain is currently reported as \"minimal.\"  /81 mmHg  Pulse 68  Temp(Src) 97.5  F (36.4  C) (Oral)  Resp 16  Ht 1.626 m (5' 4\")  Wt 69.6 kg (153 lb 7 oz)  BMI 26.32 kg/m2  SpO2 94%  LMP 12/26/2016 (Exact Date)             "

## 2017-01-04 NOTE — DISCHARGE SUMMARY
[facility] Outpatient / Observation Unit  Discharge Summary        Arcelia Odom MRN# [id number]   YOB: 1975 Age: 41 year old     Date of Admission:  1/1/2017  Date of Discharge:  1/4/2017  Admitting Physician:  Finn Jesus MD  Discharge Physician: Cecelia Stack PA-C  Discharging Service: Hospitalist      Primary Provider: Clinic, [facility]  Primary Care Physician Phone Number: [phone]         Primary Discharge Diagnoses:    Arcelia Odom was admitted on 1/1/2017 for intractable biliary colic/acute cholecystitis.     1. Intractable biliary colic/acute cholecystitis  2. S/p lap cholecystectomy. Intraoperative cholangiogram showed partially obstructing stone. Now s/p ERCP. Pain is well controlled and pt is tolerating PO intake. Lipase and LFTs are elevated today, likely due to intraoperative manipulating as abdominal pain is minimal and no nausea/vomiting. Ok to discharge, follow up with PCP and recheck labs.          Secondary Discharge Diagnoses:   History reviewed. No pertinent past medical history.             Code Status:      Full Code        Brief Hospital Summary:        Reason for your hospital stay       You were admitted for concerns of a gallbladder attack. You were seen by   general surgery who opted to remove your gallbladder. You did well   following the procedure so you were allowed to discharge home.                    Please refer to initial admission history and physical for further details.   Briefly, Arcelia Odom was admitted on 1/1/2017 with intractable biliary colic/acute cholecystitis.   Initial work up in the ED did not reveal evidence of sepsis to require inpatient hospitalization. Pt was registered to the Observation Unit for pain control and supportive measures.     Pt was resuscitated with IVF, and anti-emetics. Laboratory and imaging results indicated: LFTs were elevated and CT and US showed cholelithiasis with mild common bile duct  dilatation without definite evidence of duct stone. General surgery was consulted. Patient underwent laparoscopic cholecystectomy (please see detailed operative report). Intraoperative cholangiograms were positive so GI was consulted for ERCP. Post -op, pt did well. Pain control was transitioned from IV to PO form. At the time of discharge, pt's pain was controlled and was able to tolerate PO intake.  Medications were reviewed. Pt is instructed to follow up as below.               Significant Lab During Hospitalization:        Recent Labs  Lab 01/04/17  0625 01/03/17  1000 01/02/17  0020   WBC 8.0  --  9.5   HGB 13.1  --  13.9   HCT 38.2  --  40.9   MCV 85  --  84    262 290       Recent Labs  Lab 01/04/17  0930 01/04/17  0625 01/03/17  0625 01/02/17  0020   NA  --  139  --  137   POTASSIUM  --  3.6  --  3.4   CHLORIDE  --  106  --  102   CO2  --  23  --  27   ANIONGAP  --  10  --  8   GLC  --  73  --  105*   BUN  --  10  --  11   CR  --  0.74  --  0.73   GFRESTIMATED  --  86  --  87   GFRESTBLACK  --  >90African American GFR Calc  --  >90African American GFR Calc   JEOVANY  --  7.8*  --  9.8   PROTTOTAL 6.5*  --  6.7* 7.7   ALBUMIN 2.9*  --  3.0* 3.5   BILITOTAL 1.3  --  0.4 0.6   ALKPHOS 152*  --  105 127   *  --  98* 446*   *  --  163* 216*       Recent Labs  Lab 01/04/17  0625 01/03/17  1500 01/02/17  0020   LIPASE 793* 179 117       Recent Labs  Lab 01/01/17  2324   COLOR Yellow   APPEARANCE Clear   URINEGLC Negative   URINEBILI Negative   URINEKETONE Negative   SG 1.018   UBLD Negative   URINEPH 5.5   PROTEIN Negative   NITRITE Negative   LEUKEST Negative   RBCU 1   WBCU 0                Significant Imaging During Hospitalization:      Recent Results (from the past 48 hour(s))   XR Surgery NELL L/T 5 Min Fluoro w Stills    Narrative    INTRAOPERATIVE CHOLANGIOGRAM 1/2/2017 1:16 PM     HISTORY: Laparoscopic cholecystectomy.    FLUOROSCOPY TIME: 26 seconds.    IMAGES: A total of six spot fluoro  and/or cine loops are obtained  during exam.      Impression    IMPRESSION: C-arm images are performed during intraoperative  cholangiogram following laparoscopic cholecystectomy. Contrast  injection is performed through the cystic duct remnant. Contrast  readily fills the central intrahepatic ducts, common hepatic duct and  common bile duct. A single filling defect is noted in the distal  common bile duct consistent with an obstructing stone. Injected  contrast does reach the duodenum; therefore, a complete duct  obstruction is doubtful. Contrast readily flows into the duodenum  without delay.    Findings relayed to the surgeon at the time of the exam.    CHARLIE BENSON MD   XR ERCP    Narrative    ERCP January 3, 2017 12:08 PM     HISTORY: Postoperative laparoscopic cholecystectomy,  choledocholithiasis.    COMPARISON: Intraoperative cholangiogram 1/2/2017.      Impression    IMPRESSION: Total of about 1.5 minutes of fluoroscopy time was  utilized during ERCP. Two serial spot films were obtained. The common  duct is cannulated and opacified as are the intrahepatic ducts. Where  seen the intrahepatic and extrahepatic biliary tree appears normal,  however the distal common bile duct in the region of the previously  suspected stone is not well-opacified and distal choledocholithiasis  is not excluded. See endoscopic report for specifics.     SHELLEY GASTELUM MD              Pending Results:        Unresulted Labs Ordered in the Past 30 Days of this Admission     Date and Time Order Name Status Description    1/4/2017 0817 Hepatic panel In process               Consultations This Hospital Stay:      Consultation during this admission received from gastroenterology and surgery         Discharge Instructions and Follow-Up:            Follow-up Appointments     Follow-up and recommended labs and tests        Follow up with surgery as recommended.                        Discharge Disposition:      Discharged to home          "Discharge Medications:        Current Discharge Medication List      START taking these medications    Details   oxyCODONE (ROXICODONE) 5 MG IR tablet Take 1-2 tablets (5-10 mg) by mouth every 3 hours as needed for moderate to severe pain  Qty: 30 tablet, Refills: 0    Associated Diagnoses: Cholecystitis                 Allergies:       No Known Allergies        Condition and Physical on Discharge:      Discharge condition: Stable   Vitals: Blood pressure 123/81, pulse 68, temperature 97.5  F (36.4  C), temperature source Oral, resp. rate 16, height 1.626 m (5' 4\"), weight 69.6 kg (153 lb 7 oz), last menstrual period 12/26/2016, SpO2 94 %.  153 lbs 7.04 oz      GENERAL:  Comfortable.  PSYCH: pleasant, oriented, No acute distress.  HEART:  Normal S1, S2 with no murmur, no pericardial rub, gallops or S3 or S4.  LUNGS:  Clear to auscultation, normal Respiratory effort. No wheezing, rales or ronchi.  ABDOMEN:  Soft, normal bowel sounds. Appropriate jaxson-incisional tenderness. Dressing c/d/i  EXTREMITIES:  No pedal edema, +2 pulses bilateral and equal.  SKIN:  Dry to touch, No rash, wound or ulcerations.  NEUROLOGIC:  Grossly intact    Cecelia Stack PA-C  "

## 2017-01-04 NOTE — PROGRESS NOTES
[facility]    Post-ERCP check.     Patient with minimal pain. Tolerated diet.     LFTs and lipase elevated today -- This is common after ERCP with sphincterotomy and less concerning given patient is with minimal pain.     Instructed patient to return to hospital if pain worsens post-discharge, otherwise can have LFTs redrawn in one week with primary clinic. No GI follow up necessary for ERCP.

## 2017-01-04 NOTE — PLAN OF CARE
Problem: Goal Outcome Summary  Goal: Goal Outcome Summary  Outcome: No Change  VSS, afebrile. Complaining of nausea and epigastric pain. Backed off of clear liquids back to ice chips and nausea resolved with compazine. Bowel sounds very hypoactive. Denies passing flatus. Ambulated in halls x2. Voiding without difficulty. Will continue to monitor and provide for needs.

## 2017-01-04 NOTE — PROGRESS NOTES
"[facility]   General Surgery Progress Note           Assessment and Plan:   Assessment:   POD #2 s/p laparoscopic cholecystectomy with intraoperative cholangiograms  POD#1s/p Procedure(s):  ENDOSCOPIC RETROGRADE CHOLANGIOPANCREATOGRAM with Sphinctorotomy with Stone Extraction.  - Wound Class: II-Clean Contaminated  Afebrile      Plan:   -OK to DC today when tolerating low fat diet and pending labs per hospitalist.   -DC Rx: oxycodone.    -OTC bowel program prn.    -RTC 2-3 weeks for postop appt.    -Discharge instructions were reviewed with the patient in detail.  All her questions/concerns were addressed.  She is aware that a printed copy of instructions will be given to her upon discharge.  -Work note written         Interval History:   Comfortable in bed, Denies abdominal pain, family at bedside. Up in room, voiding independently, denies nausea/vomiting and will trial low fat diet for breakfast. +flatus, -BM.         Physical Exam:   Blood pressure 123/81, pulse 68, temperature 97.5  F (36.4  C), temperature source Oral, resp. rate 16, height 1.626 m (5' 4\"), weight 69.6 kg (153 lb 7 oz), last menstrual period 12/26/2016, SpO2 94 %.    I/O last 3 completed shifts:  In: 3194 [P.O.:100; I.V.:3094]  Out: 240 [Urine:175; Drains:65]    Abdomen:   soft, non-distended, tenderness noted at drain site and no masses palpated   Inc(s) - clean, dry, dermabond intact        Alonzo - serosanguinous, non bilious, without leakage.  Patient was premedicated with 0.3 IV Dilaudid and the drain was removed without difficulty and the patient tolerated this well. Incision was dressed with 4x4 and medipore tape.           Data:     Recent Labs   Lab Test  01/04/17   0625  01/02/17   0020   HGB  13.1  13.9   WBC  8.0  9.5       Raquel Spencer PA-C     Pt seen and examined, agree with above.  Oka for d/c today from our perspective.  "

## 2017-01-20 ENCOUNTER — OFFICE VISIT (OUTPATIENT)
Dept: SURGERY | Facility: CLINIC | Age: 42
End: 2017-01-20
Payer: COMMERCIAL

## 2017-01-20 VITALS
HEIGHT: 64 IN | SYSTOLIC BLOOD PRESSURE: 118 MMHG | WEIGHT: 153 LBS | DIASTOLIC BLOOD PRESSURE: 78 MMHG | OXYGEN SATURATION: 97 % | HEART RATE: 88 BPM | BODY MASS INDEX: 26.12 KG/M2

## 2017-01-20 DIAGNOSIS — Z09 SURGICAL FOLLOWUP VISIT: Primary | ICD-10-CM

## 2017-01-20 PROCEDURE — 99024 POSTOP FOLLOW-UP VISIT: CPT | Performed by: PHYSICIAN ASSISTANT

## 2017-01-20 NOTE — Clinical Note
2017    [facility]       RE:  Arcelia Odom-:  [birth year]    Subjective:  Arcelia Odom is here for her first postoperative visit. She underwent a laparoscopic cholecystectomy by Dr. Sol on 17. Today she tells me she has been feeling well since surgery. She currently does not require narcotic pain medications, she is eating a normal diet and her bowels are regular. Her only concern is a rash that was on her hands and neck that has significantly improved. Her hands still itch from the rash.    Objective:  Abd - soft, non-tender, non-distended  Inc - c/d/i, no erythema, +healing ridge, no masses  Hands - resolving papular rash    Assessment:  Rash, resolving  S/p laparoscopic cholecystectomy. The pathology confirms chronic cholecystitis, cholesterolosis and cholelithiasis.    Plan:  Can try OTC hydrocortisone cream to help with pruritis from rash  Low-fat diet for one month  Activity as tolerated  RTC PRN      Reinier Shea PA-C

## 2017-01-20 NOTE — MR AVS SNAPSHOT
"              After Visit Summary   2017    Arcelia Odom    MRN: [id number]           Patient Information     Date Of Birth          1975        Visit Information        Provider Department      2017 4:00 PM Reinier Shea PA-C [facility] General Surgery      Today's Diagnoses     Surgical followup visit    -  1        Follow-ups after your visit        Follow-up notes from your care team     Return if symptoms worsen or fail to improve.      Who to contact     If you have questions or need follow up information about today's clinic visit or your schedule please contact [facility] directly at [phone].  Normal or non-critical lab and imaging results will be communicated to you by AlertaPhonehart, letter or phone within 4 business days after the clinic has received the results. If you do not hear from us within 7 days, please contact the clinic through AlertaPhonehart or phone. If you have a critical or abnormal lab result, we will notify you by phone as soon as possible.  Submit refill requests through [email or web address] or call your pharmacy and they will forward the refill request to us. Please allow 3 business days for your refill to be completed.          Additional Information About Your Visit        MyChart Information     [email or web address] lets you send messages to your doctor, view your test results, renew your prescriptions, schedule appointments and more. To sign up, go to [email or web address] . Click on \"Log in\" on the left side of the screen, which will take you to the Welcome page. Then click on \"Sign up Now\" on the right side of the page.     You will be asked to enter the access code listed below, as well as some personal information. Please follow the directions to create your username and password.     Your access code is: [email or web address]  Expires: 2017  3:52 AM     Your access code will  in 90 days. If you need help or " "a new code, please call your [facility] or [phone].        Care EveryWhere ID     This is your Care EveryWhere ID. This could be used by other organizations to access your [place] medical records  [id number]        Your Vitals Were     Pulse Height BMI (Body Mass Index) Pulse Oximetry Last Period       88 5' 4\" (1.626 m) 26.25 kg/m2 97% 12/26/2016 (Exact Date)        Blood Pressure from Last 3 Encounters:   01/20/17 118/78   01/04/17 123/81    Weight from Last 3 Encounters:   01/20/17 153 lb (69.4 kg)   01/02/17 153 lb 7 oz (69.6 kg)              Today, you had the following     No orders found for display       Primary Care Provider Office Phone # Fax #    [facility] [phone] [address] [facility] [address]        Thank you!     Thank you for choosing [facility]  for your care. Our goal is always to provide you with excellent care. Hearing back from our patients is one way we can continue to improve our services. Please take a few minutes to complete the written survey that you may receive in the mail after your visit with us. Thank you!             Your Updated Medication List - Protect others around you: Learn how to safely use, store and throw away your medicines at www.disposemymeds.org.          This list is accurate as of: 1/20/17  4:25 PM.  Always use your most recent med list.                   Brand Name Dispense Instructions for use    oxyCODONE 5 MG IR tablet    ROXICODONE    30 tablet    Take 1-2 tablets (5-10 mg) by mouth every 3 hours as needed for moderate to severe pain         "

## 2017-01-20 NOTE — PROGRESS NOTES
[facility] Note     Subjective:  Arcelia Odom is here for her first postoperative visit. She underwent a laparoscopic cholecystectomy by Dr. Sol on 1/2/17. Today she tells me she has been feeling well since surgery. She currently does not require narcotic pain medications, she is eating a normal diet and her bowels are regular. Her only concern is a rash that was on her hands and neck that has significantly improved. Her hands still itch from the rash.    Objective:  Abd - soft, non-tender, non-distended  Inc - c/d/i, no erythema, +healing ridge, no masses  Hands - resolving papular rash    Assessment:  Rash, resolving  S/p laparoscopic cholecystectomy. The pathology confirms chronic cholecystitis, cholesterolosis and cholelithiasis.    Plan:  Can try OTC hydrocortisone cream to help with pruritis from rash  Low-fat diet for one month  Activity as tolerated  RTC PRN      Reinier Shea PA-C  1/20/2017      Please route or send letter to:  Primary Care Provider (PCP)      CHARLA SOLIS      Physical Exam

## 2017-09-08 NOTE — BRIEF OP NOTE
"   * BLADDER INFECTION,Female (Adult)    A bladder infection (\"cystitis\" or \"UTI\") usually causes a constant urge to urinate and a burning when passing urine. Urine may be cloudy, smelly or dark. There may be pain in the lower abdomen. A bladder infection occurs when bacteria from the vaginal area enter the bladder opening (urethra). This can occur from sexual intercourse, wearing tight clothing, dehydration and other factors.  HOME CARE:  1. Drink lots of fluids (at least 6-8 glasses a day, unless you must restrict fluids for other medical reasons). This will force the medicine into your urinary system and flush the bacteria out of your body. Cranberry juice has been shown to help clear out the bacteria.  2. Avoid sexual intercourse until your symptoms are gone.  3. A bladder infection is treated with antibiotics. You may also be given Pyridium (generic = phenazopyridine) to reduce the burning sensation. This medicine will cause your urine to become a bright orange color. The orange urine may stain clothing. You may wear a pad or panty-liner to protect clothing.  PREVENTING FUTURE INFECTIONS:  1. Always wipe from front to back after a bowel movement.  2. Keep the genital area clean and dry.  3. Drink plenty of fluids each day to avoid dehydration.  4. Urinate right after intercourse to flush out the bladder.  5. Wear cotton underwear and cotton-lined panty hose; avoid tight-fitting pants.  6. If you are on birth control pills and are having frequent bladder infections, discuss with your doctor.  FOLLOW UP: Return to this facility or see your doctor if ALL symptoms are not gone after three days of treatment.  GET PROMPT MEDICAL ATTENTION if any of the following occur:    Fever over 101 F (38.3 C)    No improvement by the third day of treatment    Increasing back or abdominal pain    Repeated vomiting; unable to keep medicine down    Weakness, dizziness or fainting    Vaginal discharge    Pain, redness or swelling in " [facility] Brief Operative Note    Pre-operative diagnosis: cholecystitis   Post-operative diagnosis Acute cholecystitis with retained common duct stone.   Procedure: Procedure(s):  LAPAROSCOPIC CHOLECYSTECTOMY  - Wound Class: II-Clean Contaminated   Surgeon(s): Surgeon(s) and Role:     * Keaton Vergara MD - Primary     * Dipika Izquierdo PA-C - Assisting   Estimated blood loss: 5 mL    Specimens:   ID Type Source Tests Collected by Time Destination   A : Gallbladder and Contents Tissue Gallbladder and Contents SURGICAL PATHOLOGY EXAM Keaton Vergara MD 1/2/2017  1:19 PM       Findings: Acute edema of the gallbladder, distal common duct stone with meniscus, unable to flush through.      the labia (outer vaginal area)    [id number] [employer], [address] [place] [address], [place] [address]. All rights reserved. This information is not intended as a substitute for professional medical care. Always follow your healthcare professional's instructions.

## 2021-03-22 NOTE — PLAN OF CARE
Impression: Vitreous degeneration, left eye: H43.812. Left.
released VMT OD Plan: OCT ordered and performed today. Discussed diagnosis with patient. The clinical exam is consistent with Posterior Vitreous Detachment. Fortunately, no retinal breaks were identified. The warning signs and symptoms of retinal breaks/detachment. The patient understands to call immediately if any of these symptoms are experienced. Problem: Goal Outcome Summary  Goal: Goal Outcome Summary  Outcome: Improving  AO, VSS. Sites derma bond CDI, NAHED put 25ml out dressing small old dried drainage closed to suctions and serosageous drainage. Pain managed by IV dilaudid. BS active passing gas, voiding adequately. Up SBA. Denied any nausea

## (undated) DEVICE — DRAPE C-ARM 60X42" 1013

## (undated) DEVICE — SU DERMABOND MINI DHVM12

## (undated) DEVICE — ENDO CANNULA 05MM VERSAONE UNIVERSAL UNVCA5STF

## (undated) DEVICE — ESU CORD MONOPOLAR 10'  E0510

## (undated) DEVICE — LINEN HALF SHEET 5512

## (undated) DEVICE — DRAIN JACKSON PRATT RESERVOIR 100ML SU130-1305

## (undated) DEVICE — GLOVE PROTEXIS POWDER FREE 7.5 ORTHOPEDIC 2D73ET75

## (undated) DEVICE — RAD RX ISOVUE 300 (50ML) 61% IOPAMIDOL CHARGE PER ML

## (undated) DEVICE — COVER FOOTSWITCH URO

## (undated) DEVICE — LIGHT HANDLE X2

## (undated) DEVICE — PACK ERCP CUSTOM RIDGES

## (undated) DEVICE — BAG CLEAR TRASH 1.3M 39X33" P4040C

## (undated) DEVICE — TAPE CLOTH ADHESIVE 3" LATEX FREE

## (undated) DEVICE — SUCTION CANISTER STRAW 65652-008

## (undated) DEVICE — SU VICRYL 4-0 PS-2 18" UND J496H

## (undated) DEVICE — ENDO TROCAR BLUNT 100MM W/THRD ANCHOR BLUNTPORT BPT12STS

## (undated) DEVICE — DRAIN JACKSON PRATT 15FR ROUND SIL LF JP-2229

## (undated) DEVICE — DECANTER BAG 2002S

## (undated) DEVICE — PREP CHLORAPREP 26ML TINTED ORANGE  260815

## (undated) DEVICE — WIRE GUIDE 0.035"X450CM JAGWIRE HP STR TIP M00556580

## (undated) DEVICE — COVER FOOTSWITCH W/CINCH 20X24" 923267

## (undated) DEVICE — LINEN FULL SHEET 5511

## (undated) DEVICE — SOL WATER IRRIG 1000ML BOTTLE 2F7114

## (undated) DEVICE — SUCTION CANISTER MEDIVAC LINER 3000ML W/LID 65651-530

## (undated) DEVICE — GLOVE PROTEXIS BLUE W/NEU-THERA 8.0  2D73EB80

## (undated) DEVICE — BALLOON EXTRACTION 3-LUMEN 15X190MM 2.8MM TL B-V233P-A

## (undated) DEVICE — BLADE KNIFE SURG 11 371111

## (undated) DEVICE — CLIP APPLIER ENDO 05MM MED/LG 176630

## (undated) DEVICE — ENDO POUCH GOLD 10MM ECATCH 173050G

## (undated) DEVICE — CLIP APPLIER ENDO 05MM 176620

## (undated) DEVICE — SUCTION IRR STRYKERFLOW II W/TIP 250-070-520

## (undated) DEVICE — SU VICRYL 0 UR-6 27" J603H

## (undated) DEVICE — ENDO DISSECTOR KITTNER 05MM 28-0804

## (undated) DEVICE — SOL NACL 0.9% INJ 250ML BAG 2B1322Q

## (undated) DEVICE — SPHINCTEROTOME 7FRX25MM TRITOME G22555

## (undated) DEVICE — ESU GROUND PAD ADULT W/CORD E7507

## (undated) DEVICE — Device

## (undated) DEVICE — ENDO TROCAR 05MM VERSAONE BLADELESS W/STD FIX CAN NONB5STF

## (undated) DEVICE — LINEN POUCH DBL 5427

## (undated) DEVICE — LINEN TOWEL PACK X10 5473

## (undated) DEVICE — SU ETHILON 2-0 PS 18" 585H

## (undated) DEVICE — GOWN XLG DISP 9545

## (undated) DEVICE — SYR 30ML LL W/O NDL 302832

## (undated) RX ORDER — INDOMETHACIN 50 MG/1
SUPPOSITORY RECTAL
Status: DISPENSED
Start: 2017-01-03

## (undated) RX ORDER — BUPIVACAINE HYDROCHLORIDE 5 MG/ML
INJECTION, SOLUTION EPIDURAL; INTRACAUDAL
Status: DISPENSED
Start: 2017-01-02

## (undated) RX ORDER — CEFAZOLIN SODIUM 2 G/100ML
INJECTION, SOLUTION INTRAVENOUS
Status: DISPENSED
Start: 2017-01-02

## (undated) RX ORDER — FENTANYL CITRATE 50 UG/ML
INJECTION, SOLUTION INTRAMUSCULAR; INTRAVENOUS
Status: DISPENSED
Start: 2017-01-02